# Patient Record
Sex: FEMALE | URBAN - METROPOLITAN AREA
[De-identification: names, ages, dates, MRNs, and addresses within clinical notes are randomized per-mention and may not be internally consistent; named-entity substitution may affect disease eponyms.]

---

## 2017-09-28 ENCOUNTER — HOSPITAL ENCOUNTER (OUTPATIENT)
Dept: LAB | Age: 82
Discharge: HOME | End: 2017-09-28

## 2017-09-28 PROCEDURE — 88360 TUMOR IMMUNOHISTOCHEM/MANUAL: CPT | Performed by: OPHTHALMOLOGY

## 2017-09-28 PROCEDURE — 88307 TISSUE EXAM BY PATHOLOGIST: CPT | Performed by: OPHTHALMOLOGY

## 2017-09-28 PROCEDURE — 88342 IMHCHEM/IMCYTCHM 1ST ANTB: CPT | Performed by: OPHTHALMOLOGY

## 2017-09-28 PROCEDURE — 88365 INSITU HYBRIDIZATION (FISH): CPT | Performed by: OPHTHALMOLOGY

## 2021-06-01 ENCOUNTER — TELEPHONE (OUTPATIENT)
Dept: PALLATIVE CARE | Age: 86
End: 2021-06-01

## 2021-06-01 NOTE — TELEPHONE ENCOUNTER
The patient's daughter, iDlip Meek is calling to confirm if she should go to Hospice or Palliative Care. She is in an assisted living facility at the time and has dementia.  Her call back number is [phone]

## 2021-06-01 NOTE — TELEPHONE ENCOUNTER
[employer] Palliative Medicine Office  Nursing Note  [phone]  Fax [phone]     Name:  Carey Carrasco  YOB: 1933     Returned call to patient's daughter Marcelina Farah who is asking about information regarding Palliative and Hospice. She states her mother has dementia and resides in assisted living at [address] [place] [address] in [place]. This nurse explained Palliative Medicine and Hospice services (similarities and differences) including:     Outpatient Palliative Medicine is an office based specialty service. Provides \"extra layer of support\" for patients and families living with serious illness. Focuses on  symptom management, care decisions, improving quality of life. Palliative team works alongside current health care team and is provided with all other medical treatments. Nathanael Bills sees patients in the latter stages of a progressive serious illness. Patient can be receiving aggressive treatments or they can be seeking comfort measures. Hospice is an all-inclusive service in terms of payment (meds, DME, oxygen, RN visits, aides, respite are covered). Treatment concentrates on comfort rather than aggressive disease abatement. Hospice care is generally for people with a life expectancy of 6 months or less (if the illness runs its normal course). It requires an order by a provider. Marcelina Farah states she thinks hospice is a good option for her mother if she qualifies. She is in the process of switching her mother's PCP to the staff physician at [facility] and she will discuss hospice with the new provider.     ADAIR ChanceN, RN  Palliative Medicine  [phone]

## 2024-04-15 ENCOUNTER — HOSPITAL ENCOUNTER (INPATIENT)
Facility: HOSPITAL | Age: 89
LOS: 4 days | Discharge: NURSING, REHAB OR LONG-TERM CARE | End: 2024-04-19
Attending: EMERGENCY MEDICINE | Admitting: STUDENT IN AN ORGANIZED HEALTH CARE EDUCATION/TRAINING PROGRAM
Payer: MEDICARE

## 2024-04-15 ENCOUNTER — APPOINTMENT (OUTPATIENT)
Facility: HOSPITAL | Age: 89
End: 2024-04-15
Payer: MEDICARE

## 2024-04-15 DIAGNOSIS — A41.9 SEVERE SEPSIS (HCC): ICD-10-CM

## 2024-04-15 DIAGNOSIS — R65.20 SEVERE SEPSIS (HCC): ICD-10-CM

## 2024-04-15 DIAGNOSIS — N39.0 URINARY TRACT INFECTION WITHOUT HEMATURIA, SITE UNSPECIFIED: Primary | ICD-10-CM

## 2024-04-15 DIAGNOSIS — J18.9 MULTIFOCAL PNEUMONIA: ICD-10-CM
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
## 2024-04-15 DIAGNOSIS — F41.9 ANXIETY: ICD-10-CM

## 2024-04-15 DIAGNOSIS — R09.02 HYPOXEMIA: ICD-10-CM

## 2024-04-15 PROBLEM — J15.9 COMMUNITY ACQUIRED BACTERIAL PNEUMONIA: Status: ACTIVE | Noted: 2024-04-15

## 2024-04-15 LAB
ANION GAP BLD CALC-SCNC: 8 (ref 10–20)
APPEARANCE UR: CLEAR
BACTERIA URNS QL MICRO: ABNORMAL /HPF
BASOPHILS # BLD: 0 K/UL (ref 0–0.1)
BASOPHILS NFR BLD: 0 % (ref 0–1)
BILIRUB UR QL: NEGATIVE
CA-I BLD-MCNC: 1.16 MMOL/L (ref 1.12–1.32)
CHLORIDE BLD-SCNC: 106 MMOL/L (ref 100–108)
CK SERPL-CCNC: 156 U/L (ref 26–192)
CO2 BLD-SCNC: 26 MMOL/L (ref 19–24)
COLOR UR: ABNORMAL
CREAT UR-MCNC: <0.3 MG/DL (ref 0.6–1.3)
D DIMER PPP FEU-MCNC: 17.25 MG/L FEU (ref 0–0.65)
DIFFERENTIAL METHOD BLD: ABNORMAL
EKG ATRIAL RATE: 108 BPM
EKG DIAGNOSIS: NORMAL
EKG P AXIS: 76 DEGREES
EKG P-R INTERVAL: 162 MS
EKG Q-T INTERVAL: 356 MS
EKG QRS DURATION: 110 MS
EKG QTC CALCULATION (BAZETT): 477 MS
EKG R AXIS: 6 DEGREES
EKG T AXIS: 91 DEGREES
EKG VENTRICULAR RATE: 108 BPM
EOSINOPHIL # BLD: 0.1 K/UL (ref 0–0.4)
EOSINOPHIL NFR BLD: 1 % (ref 0–7)
EPITH CASTS URNS QL MICRO: ABNORMAL /LPF
ERYTHROCYTE [DISTWIDTH] IN BLOOD BY AUTOMATED COUNT: 14.1 % (ref 11.5–14.5)
GLUCOSE BLD STRIP.AUTO-MCNC: 92 MG/DL (ref 74–106)
GLUCOSE UR STRIP.AUTO-MCNC: NEGATIVE MG/DL
HCO3 BLDA-SCNC: 27 MMOL/L
HCT VFR BLD AUTO: 40.5 % (ref 35–47)
HGB BLD-MCNC: 13.2 G/DL (ref 11.5–16)
HGB UR QL STRIP: ABNORMAL
IMM GRANULOCYTES # BLD AUTO: 0.1 K/UL (ref 0–0.04)
IMM GRANULOCYTES NFR BLD AUTO: 0 % (ref 0–0.5)
KETONES UR QL STRIP.AUTO: NEGATIVE MG/DL
LACTATE BLD-SCNC: 1.04 MMOL/L (ref 0.4–2)
LACTATE BLD-SCNC: 2.35 MMOL/L (ref 0.4–2)
LEUKOCYTE ESTERASE UR QL STRIP.AUTO: ABNORMAL
LYMPHOCYTES # BLD: 1.6 K/UL (ref 0.8–3.5)
LYMPHOCYTES NFR BLD: 13 % (ref 12–49)
MCH RBC QN AUTO: 30.3 PG (ref 26–34)
MCHC RBC AUTO-ENTMCNC: 32.6 G/DL (ref 30–36.5)
MCV RBC AUTO: 92.9 FL (ref 80–99)
MONOCYTES # BLD: 0.9 K/UL (ref 0–1)
MONOCYTES NFR BLD: 7 % (ref 5–13)
NEUTS SEG # BLD: 10.1 K/UL (ref 1.8–8)
NEUTS SEG NFR BLD: 79 % (ref 32–75)
NITRITE UR QL STRIP.AUTO: POSITIVE
NRBC # BLD: 0 K/UL (ref 0–0.01)
NRBC BLD-RTO: 0 PER 100 WBC
PCO2 BLDV: 37.2 MMHG (ref 41–51)
PH BLDV: 7.47 (ref 7.32–7.42)
PH UR STRIP: 6.5 (ref 5–8)
PLATELET # BLD AUTO: 272 K/UL (ref 150–400)
PMV BLD AUTO: 10.2 FL (ref 8.9–12.9)
PO2 BLDV: 38 MMHG (ref 25–40)
POTASSIUM BLD-SCNC: 4.7 MMOL/L (ref 3.5–5.5)
PROT UR STRIP-MCNC: NEGATIVE MG/DL
RBC # BLD AUTO: 4.36 M/UL (ref 3.8–5.2)
RBC #/AREA URNS HPF: ABNORMAL /HPF (ref 0–5)
SAO2 % BLD: 75 %
SERVICE CMNT-IMP: ABNORMAL
SODIUM BLD-SCNC: 140 MMOL/L (ref 136–145)
SP GR UR REFRACTOMETRY: 1.02 (ref 1–1.03)
SPECIMEN SITE: ABNORMAL
TROPONIN I SERPL HS-MCNC: 23 NG/L (ref 0–51)
UROBILINOGEN UR QL STRIP.AUTO: 0.2 EU/DL (ref 0.2–1)
WBC # BLD AUTO: 12.8 K/UL (ref 3.6–11)
WBC URNS QL MICRO: ABNORMAL /HPF (ref 0–4)

## 2024-04-15 PROCEDURE — 6360000002 HC RX W HCPCS: Performed by: STUDENT IN AN ORGANIZED HEALTH CARE EDUCATION/TRAINING PROGRAM

## 2024-04-15 PROCEDURE — 82330 ASSAY OF CALCIUM: CPT

## 2024-04-15 PROCEDURE — 36415 COLL VENOUS BLD VENIPUNCTURE: CPT

## 2024-04-15 PROCEDURE — 2580000003 HC RX 258: Performed by: STUDENT IN AN ORGANIZED HEALTH CARE EDUCATION/TRAINING PROGRAM

## 2024-04-15 PROCEDURE — 6360000004 HC RX CONTRAST MEDICATION: Performed by: STUDENT IN AN ORGANIZED HEALTH CARE EDUCATION/TRAINING PROGRAM

## 2024-04-15 PROCEDURE — 74176 CT ABD & PELVIS W/O CONTRAST: CPT

## 2024-04-15 PROCEDURE — APPNB60 APP NON BILLABLE TIME 46-60 MINS: Performed by: NURSE PRACTITIONER

## 2024-04-15 PROCEDURE — 84295 ASSAY OF SERUM SODIUM: CPT

## 2024-04-15 PROCEDURE — 84484 ASSAY OF TROPONIN QUANT: CPT

## 2024-04-15 PROCEDURE — 71275 CT ANGIOGRAPHY CHEST: CPT

## 2024-04-15 PROCEDURE — 82550 ASSAY OF CK (CPK): CPT

## 2024-04-15 PROCEDURE — 6370000000 HC RX 637 (ALT 250 FOR IP): Performed by: EMERGENCY MEDICINE

## 2024-04-15 PROCEDURE — 93010 ELECTROCARDIOGRAM REPORT: CPT | Performed by: SPECIALIST

## 2024-04-15 PROCEDURE — 71250 CT THORAX DX C-: CPT

## 2024-04-15 PROCEDURE — 82803 BLOOD GASES ANY COMBINATION: CPT

## 2024-04-15 PROCEDURE — 84132 ASSAY OF SERUM POTASSIUM: CPT

## 2024-04-15 PROCEDURE — 2700000000 HC OXYGEN THERAPY PER DAY

## 2024-04-15 PROCEDURE — 87040 BLOOD CULTURE FOR BACTERIA: CPT

## 2024-04-15 PROCEDURE — 72125 CT NECK SPINE W/O DYE: CPT

## 2024-04-15 PROCEDURE — 82947 ASSAY GLUCOSE BLOOD QUANT: CPT

## 2024-04-15 PROCEDURE — 1100000000 HC RM PRIVATE

## 2024-04-15 PROCEDURE — 85379 FIBRIN DEGRADATION QUANT: CPT

## 2024-04-15 PROCEDURE — 2580000003 HC RX 258: Performed by: EMERGENCY MEDICINE

## 2024-04-15 PROCEDURE — 81001 URINALYSIS AUTO W/SCOPE: CPT

## 2024-04-15 PROCEDURE — 94761 N-INVAS EAR/PLS OXIMETRY MLT: CPT

## 2024-04-15 PROCEDURE — 85025 COMPLETE CBC W/AUTO DIFF WBC: CPT

## 2024-04-15 PROCEDURE — 93005 ELECTROCARDIOGRAM TRACING: CPT | Performed by: EMERGENCY MEDICINE

## 2024-04-15 PROCEDURE — 96374 THER/PROPH/DIAG INJ IV PUSH: CPT

## 2024-04-15 PROCEDURE — 99285 EMERGENCY DEPT VISIT HI MDM: CPT

## 2024-04-15 PROCEDURE — 6370000000 HC RX 637 (ALT 250 FOR IP): Performed by: STUDENT IN AN ORGANIZED HEALTH CARE EDUCATION/TRAINING PROGRAM

## 2024-04-15 PROCEDURE — 83605 ASSAY OF LACTIC ACID: CPT

## 2024-04-15 PROCEDURE — 70450 CT HEAD/BRAIN W/O DYE: CPT

## 2024-04-15 RX ORDER — BUSPIRONE HYDROCHLORIDE 5 MG/1
5 TABLET ORAL 2 TIMES DAILY
Status: DISCONTINUED | OUTPATIENT
Start: 2024-04-15 | End: 2024-04-19 | Stop reason: HOSPADM

## 2024-04-15 RX ORDER — SODIUM CHLORIDE 0.9 % (FLUSH) 0.9 %
5-40 SYRINGE (ML) INJECTION EVERY 12 HOURS SCHEDULED
Status: DISCONTINUED | OUTPATIENT
Start: 2024-04-15 | End: 2024-04-19 | Stop reason: HOSPADM

## 2024-04-15 RX ORDER — 0.9 % SODIUM CHLORIDE 0.9 %
1000 INTRAVENOUS SOLUTION INTRAVENOUS ONCE
Status: DISCONTINUED | OUTPATIENT
Start: 2024-04-15 | End: 2024-04-15

## 2024-04-15 RX ORDER — POTASSIUM CHLORIDE 750 MG/1
40 TABLET, FILM COATED, EXTENDED RELEASE ORAL PRN
Status: DISCONTINUED | OUTPATIENT
Start: 2024-04-15 | End: 2024-04-18

## 2024-04-15 RX ORDER — POLYETHYLENE GLYCOL 3350 17 G/17G
17 POWDER, FOR SOLUTION ORAL DAILY PRN
Status: DISCONTINUED | OUTPATIENT
Start: 2024-04-15 | End: 2024-04-19 | Stop reason: HOSPADM

## 2024-04-15 RX ORDER — ACETAMINOPHEN 650 MG/1
650 SUPPOSITORY RECTAL EVERY 6 HOURS PRN
Status: DISCONTINUED | OUTPATIENT
Start: 2024-04-15 | End: 2024-04-19 | Stop reason: HOSPADM

## 2024-04-15 RX ORDER — SODIUM CHLORIDE 9 MG/ML
INJECTION, SOLUTION INTRAVENOUS PRN
Status: DISCONTINUED | OUTPATIENT
Start: 2024-04-15 | End: 2024-04-19 | Stop reason: HOSPADM

## 2024-04-15 RX ORDER — ONDANSETRON 2 MG/ML
4 INJECTION INTRAMUSCULAR; INTRAVENOUS EVERY 6 HOURS PRN
Status: DISCONTINUED | OUTPATIENT
Start: 2024-04-15 | End: 2024-04-19 | Stop reason: HOSPADM

## 2024-04-15 RX ORDER — ACETAMINOPHEN 325 MG/1
650 TABLET ORAL EVERY 6 HOURS PRN
Status: DISCONTINUED | OUTPATIENT
Start: 2024-04-15 | End: 2024-04-19 | Stop reason: HOSPADM

## 2024-04-15 RX ORDER — ONDANSETRON 4 MG/1
4 TABLET, ORALLY DISINTEGRATING ORAL EVERY 8 HOURS PRN
Status: DISCONTINUED | OUTPATIENT
Start: 2024-04-15 | End: 2024-04-19 | Stop reason: HOSPADM

## 2024-04-15 RX ORDER — MEMANTINE HYDROCHLORIDE 10 MG/1
10 TABLET ORAL 2 TIMES DAILY
Status: DISCONTINUED | OUTPATIENT
Start: 2024-04-15 | End: 2024-04-19 | Stop reason: HOSPADM

## 2024-04-15 RX ORDER — 0.9 % SODIUM CHLORIDE 0.9 %
30 INTRAVENOUS SOLUTION INTRAVENOUS ONCE
Status: COMPLETED | OUTPATIENT
Start: 2024-04-15 | End: 2024-04-15

## 2024-04-15 RX ORDER — CLONAZEPAM 1 MG/1
1 TABLET ORAL NIGHTLY
Status: DISCONTINUED | OUTPATIENT
Start: 2024-04-15 | End: 2024-04-19 | Stop reason: HOSPADM

## 2024-04-15 RX ORDER — FAMOTIDINE 20 MG/1
20 TABLET, FILM COATED ORAL 2 TIMES DAILY
Status: DISCONTINUED | OUTPATIENT
Start: 2024-04-15 | End: 2024-04-18

## 2024-04-15 RX ORDER — 0.9 % SODIUM CHLORIDE 0.9 %
30 INTRAVENOUS SOLUTION INTRAVENOUS ONCE
Status: DISCONTINUED | OUTPATIENT
Start: 2024-04-15 | End: 2024-04-15

## 2024-04-15 RX ORDER — SODIUM CHLORIDE 0.9 % (FLUSH) 0.9 %
5-40 SYRINGE (ML) INJECTION PRN
Status: DISCONTINUED | OUTPATIENT
Start: 2024-04-15 | End: 2024-04-19 | Stop reason: HOSPADM

## 2024-04-15 RX ORDER — MAGNESIUM SULFATE IN WATER 40 MG/ML
2000 INJECTION, SOLUTION INTRAVENOUS PRN
Status: DISCONTINUED | OUTPATIENT
Start: 2024-04-15 | End: 2024-04-19 | Stop reason: HOSPADM

## 2024-04-15 RX ORDER — POTASSIUM CHLORIDE 7.45 MG/ML
10 INJECTION INTRAVENOUS PRN
Status: DISCONTINUED | OUTPATIENT
Start: 2024-04-15 | End: 2024-04-18

## 2024-04-15 RX ORDER — ACETAMINOPHEN 500 MG
1000 TABLET ORAL
Status: COMPLETED | OUTPATIENT
Start: 2024-04-15 | End: 2024-04-15

## 2024-04-15 RX ORDER — ENOXAPARIN SODIUM 100 MG/ML
40 INJECTION SUBCUTANEOUS DAILY
Status: DISCONTINUED | OUTPATIENT
Start: 2024-04-15 | End: 2024-04-19 | Stop reason: HOSPADM

## 2024-04-15 RX ADMIN — BUSPIRONE HYDROCHLORIDE 5 MG: 5 TABLET ORAL at 21:38

## 2024-04-15 RX ADMIN — MEMANTINE 10 MG: 10 TABLET ORAL at 21:38

## 2024-04-15 RX ADMIN — ACETAMINOPHEN 1000 MG: 500 TABLET ORAL at 06:21

## 2024-04-15 RX ADMIN — ACETAMINOPHEN 650 MG: 325 TABLET ORAL at 18:07

## 2024-04-15 RX ADMIN — CLONAZEPAM 1 MG: 1 TABLET ORAL at 21:38

## 2024-04-15 RX ADMIN — WATER 1000 MG: 1 INJECTION INTRAMUSCULAR; INTRAVENOUS; SUBCUTANEOUS at 07:37

## 2024-04-15 RX ADMIN — FAMOTIDINE 20 MG: 20 TABLET, FILM COATED ORAL at 14:04

## 2024-04-15 RX ADMIN — AZITHROMYCIN MONOHYDRATE 500 MG: 500 INJECTION, POWDER, LYOPHILIZED, FOR SOLUTION INTRAVENOUS at 09:17

## 2024-04-15 RX ADMIN — IOPAMIDOL 75 ML: 755 INJECTION, SOLUTION INTRAVENOUS at 17:32

## 2024-04-15 RX ADMIN — SODIUM CHLORIDE 1000 ML: 9 INJECTION, SOLUTION INTRAVENOUS at 07:38

## 2024-04-15 RX ADMIN — MEMANTINE 10 MG: 10 TABLET ORAL at 14:04

## 2024-04-15 RX ADMIN — FAMOTIDINE 20 MG: 20 TABLET, FILM COATED ORAL at 21:38

## 2024-04-15 RX ADMIN — SODIUM CHLORIDE 1503 ML: 9 INJECTION, SOLUTION INTRAVENOUS at 07:40

## 2024-04-15 RX ADMIN — SODIUM CHLORIDE, PRESERVATIVE FREE 10 ML: 5 INJECTION INTRAVENOUS at 21:38

## 2024-04-15 RX ADMIN — ENOXAPARIN SODIUM 40 MG: 100 INJECTION SUBCUTANEOUS at 14:05

## 2024-04-15 RX ADMIN — BUSPIRONE HYDROCHLORIDE 5 MG: 5 TABLET ORAL at 14:04

## 2024-04-15 RX ADMIN — SODIUM CHLORIDE 2163 ML: 9 INJECTION, SOLUTION INTRAVENOUS at 09:08

## 2024-04-15 ASSESSMENT — PAIN SCALES - GENERAL
PAINLEVEL_OUTOF10: 7
PAINLEVEL_OUTOF10: 0

## 2024-04-15 ASSESSMENT — PAIN DESCRIPTION - PAIN TYPE: TYPE: ACUTE PAIN

## 2024-04-15 ASSESSMENT — PAIN DESCRIPTION - DESCRIPTORS: DESCRIPTORS: ACHING

## 2024-04-15 ASSESSMENT — PAIN - FUNCTIONAL ASSESSMENT
PAIN_FUNCTIONAL_ASSESSMENT: 0-10
PAIN_FUNCTIONAL_ASSESSMENT: ACTIVITIES ARE NOT PREVENTED

## 2024-04-15 ASSESSMENT — PAIN DESCRIPTION - LOCATION: LOCATION: HEAD

## 2024-04-15 NOTE — ED PROVIDER NOTES
ED SIGN OUT NOTE  Care assumed at [facility] 7:08 AM EDT    Patient was signed out to me by Dr. Dumont.     Patient signed out pending lab results, imaging results, re-evaluation, and disposition      /80   Pulse (!) 109   Temp 96.8 °F (36 °C) (Rectal)   Resp 21   Ht 1.575 m (5' 2\")   Wt 72.1 kg (159 lb)   SpO2 95%   BMI 29.08 kg/m²   Labs Reviewed   CBC WITH AUTO DIFFERENTIAL - Abnormal; Notable for the following components:       Result Value    WBC 12.8 (*)     Neutrophils % 79 (*)     Neutrophils Absolute 10.1 (*)     Immature Granulocytes Absolute 0.1 (*)     All other components within normal limits   URINALYSIS WITH MICROSCOPIC - Abnormal; Notable for the following components:    Blood, Urine TRACE (*)     Nitrite, Urine Positive (*)     Leukocyte Esterase, Urine LARGE (*)     BACTERIA, URINE 3+ (*)     All other components within normal limits   POCT BLOOD GAS & ELECTROLYTES - Abnormal; Notable for the following components:    PH, VENOUS (POC) 7.47 (*)     PCO2, [place], POC 37.2 (*)     POC TCO2 26 (*)     Anion Gap, POC 8 (*)     POC Creatinine <0.3 (*)     POC Lactic Acid 2.35 (*)     All other components within normal limits   CULTURE, BLOOD 1   CULTURE, BLOOD 2   CK   EXTRA TUBES HOLD   POCT LACTIC ACID     CT Head W/O Contrast   Final Result   No acute intracranial process. Diffuse periventricular white matter disease   likely represents chronic small vessel ischemia. Left sphenoid sinus disease.            CT CSpine W/O Contrast   Final Result   No acute fracture or subluxation. Multilevel degenerative changes.      CT CHEST ABDOMEN PELVIS WO CONTRAST Additional Contrast? None   Final Result   Age-indeterminate compression fractures at L1, T7, and T6. Nodular airspace   disease in the right upper lobe and left lower lobe may represent pneumonia.   Large hiatal hernia. Additional incidental findings as detailed above.           ED Course as of 04/15/24 1856   Mon Apr 15,  2024 0623 EKG: Sinus tachycardia rate of 108 beats a minute with normal MN, QT interval.  Left bundle branch block pattern.  Negative Sgarbossa criteria.  No signs of ischemia.  EKG interpreted by Dr. Dumont [ZD]   0704 Lactate 2.35 [ZD]   0720 Sepsis suspected at this time, source UTI v pneumonia. Abx ordered. Cultures already drawn by nursing.  [MG]   0725 Severe sepsis suspected at this time, initial lactic 2.35. No history of CHF per SNF paperwork. 30cc/kg bolus ordered (2,163cc total).  [MG]   0824 Personally updated POA Calli Art and she gave permission for me to call patient's son Sumeet who actually lives closer to here as Calli is in [place]. Spoke with him to update him as well and help coordinate timing of arrival to [facility].  [MG]      ED Course User Index  [MG] Delisa Krause DO  [ZD] Jared Dumont MD     Diagnosis:   1. Urinary tract infection without hematuria, site unspecified    2. Severe sepsis (HCC)    3. Hypoxemia    4. Multifocal pneumonia    5. Fall, initial encounter      Disposition:   Admitted 04/15/2024 07:45:41 AM  Perfect Serve Consult for Admission  7:52 AM    ED Room Number: WER16/16  Patient Name and age:  Shaunna Art 89 y.o.  female  Working Diagnosis:   1. Urinary tract infection without hematuria, site unspecified    2. Severe sepsis (HCC)    3. Hypoxemia    4. Multifocal pneumonia    5. Fall, initial encounter        COVID-19 Suspicion: No  Sepsis present:  Yes  Reassessment  Readmission: No  Isolation Requirements: no  Recommended Level of Care: telemetry  Department: [place] [facility] - [phone]      Total critical care time spent exclusive of procedures:  30 minutes.     Is this patient to be included in the SEP-1 core measure? Yes SEP-1 CORE MEASURE DATA      Sepsis Criteria   Severe Sepsis Criteria   Septic Shock Criteria       Must meet 2:    []Temp >100.9 F (38.3 C) or < 96.8 F (36 C)  []HR > 90  []RR > 20  []WBC > 12 or < 4 or 10% bands    AND:    [] Infection

## 2024-04-15 NOTE — PROGRESS NOTES
Occupational Therapy Contact Note  04/15/24    Chart reviewed. Patient with elevated D-Dimer 17.25 and per RN plan for CTA of chest to r/u PE. Will defer and follow-up pending results.    Thank you,  JUAN FRANCISCO Bryant, OTR/L

## 2024-04-15 NOTE — ED NOTES
0655- Patient returned from CT scan, brief removed for sc, BM present, angi care provided prior to SC, skin lesions noted to perivaginal area, SC completed with 2nd RN GABBIE Doe and JOHN Aguirre RN. Specimens obtained. Bruising noted to left hip.     0705- difficulty starting PIV straight stick for blood specimens obtained  0708-  22g diffusics obtained in left wrist.     0715- Patient repositioned in bed, patient has no voiced complaints or concerns at this time.

## 2024-04-15 NOTE — PROGRESS NOTES
Physical Therapy    Chart reviewed. Patient pending troponin draw, with elevated D-Dimer 17.25 and per RN plan for CTA of chest. Will defer and follow-up pending results.    Leda Pugh, PT, DPT   Detail Level: None

## 2024-04-15 NOTE — H&P
Hospitalist Admission Note    NAME:  Shaunna Art   :  1933   MRN:  [id number]     Date/Time:  4/15/2024 12:57 PM    Patient PCP: None, None  ________________________________________________________________________    Given the patient's current clinical presentation, I have a high level of concern for decompensation if discharged from the emergency department.  Complex decision making was performed, which includes reviewing the patient's available past medical records, laboratory results, and x-ray films.       My assessment of this patient's clinical condition and my plan of care is as follows.    Assessment / Plan:    AHRF and severe sepsis 2/2 CAP: POA. Acute. CT with CAP. Tachycardia and leukocytosis (SIRS 2/4) with lactic acidosis. Lactic acid now normalized. S/p 30cc/kg in ED. Requiring 2L O2  - Admit to medical  - VS per unit  - can stop trending lactic  - Cont CTX and azithro  - Follow up BCx  - Wean O2  - DD to rule out PE  - Get trop to rule out Acs    UTI: POA. On UA. May be contributing to falls and sepsis.  - CTX as above  - Follow BCx and UCx    Multi level compression fractures / frequent falls: POA. No pain currently so I suspect these might be chronic. No rhabdo  - tylenol PRN  - ortho consult  - PT/OT  - fall precautions  - orthostatics    Dementia: POA. Chronic  - cont home meds    GERD: POA. Chronic  - cont home meds    I have personally reviewed the radiographs, laboratory data in Epic and decisions and statements above are based partially on this personal interpretation.    Code Status: DNR/DNI  DVT Prophylaxis: Lovenox  GI Prophylaxis: not indicated       Subjective:   CHIEF COMPLAINT: \"multiple falls\"    HISTORY OF PRESENT ILLNESS:     Shaunna is a 89 y.o.   female with PMHx dementia and GERD presents from SNF s/p recent GLF. She has no complaints for me today. In the ED she was found to have CAP.    No fever, chills, night sweats. No hearing or vision changes. No severe

## 2024-04-15 NOTE — CONSULTS
Orthopedic History and Physical     Patient: Shaunna Art MRN: [id number]  SSN: [id number]    YOB: 1933  Age: 89 y.o.  Sex: female          Subjective:     Patient is a 89 y.o.  female admitted to [facility] due to multiple falls and for sepsis 2/2 CAP, AHRH, UTI,  CT abd/ pelvis showed age-indeterminate compression fractures at L1, T7, and T6. Pt with hx of dementia, GERD who lives in a SNF.  History is obtained from the chart. Pt with dementia and unable to provide hx. Pt does however deny back pain. No family at bedside. Pt has been started on IV abx, blood cultures obtained.   Patient Active Problem List    Diagnosis Date Noted    Community acquired bacterial pneumonia 04/15/2024     No past medical history on file.   No past surgical history on file.   Prior to Admission medications    Not on File     Current Facility-Administered Medications   Medication Dose Route Frequency    azithromycin (ZITHROMAX) 500 mg in sodium chloride 0.9 % 250 mL IVPB ([id number])  500 mg IntraVENous Q24H    sodium chloride flush 0.9 % injection 5-40 mL  5-40 mL IntraVENous 2 times per day    sodium chloride flush 0.9 % injection 5-40 mL  5-40 mL IntraVENous PRN    0.9 % sodium chloride infusion   IntraVENous PRN    potassium chloride (KLOR-CON) extended release tablet 40 mEq  40 mEq Oral PRN    Or    potassium bicarb-citric acid (EFFER-K) effervescent tablet 40 mEq  40 mEq Oral PRN    Or    potassium chloride 10 mEq/100 mL IVPB (Peripheral Line)  10 mEq IntraVENous PRN    magnesium sulfate 2000 mg in 50 mL IVPB premix  2,000 mg IntraVENous PRN    enoxaparin (LOVENOX) injection 40 mg  40 mg SubCUTAneous Daily    ondansetron (ZOFRAN-ODT) disintegrating tablet 4 mg  4 mg Oral Q8H PRN    Or    ondansetron (ZOFRAN) injection 4 mg  4 mg IntraVENous Q6H PRN    polyethylene glycol (GLYCOLAX) packet 17 g  17 g Oral Daily PRN    acetaminophen (TYLENOL) tablet 650 mg  650 mg Oral Q6H PRN    Or     Esterase, Urine LARGE (A) NEG      WBC, UA  0 - 4 /hpf    RBC, UA 5-10 0 - 5 /hpf    Epithelial Cells UA FEW FEW /lpf    BACTERIA, URINE 3+ (A) NEG /hpf   POCT Blood Gas & Electrolytes    Collection Time: 04/15/24  7:00 AM   Result Value Ref Range    PH, VENOUS (POC) 7.47 (H) 7.32 - 7.42      PCO2, [place]i, POC 37.2 (L) 41 - 51 MMHG    PO2, VENOUS (POC) 38 25 - 40 mmHg    HCO3, Arterial 27 mmol/L    POC O2 SAT 75 %    POC Sodium 140 136 - 145 MMOL/L    POC Potassium 4.7 3.5 - 5.5 MMOL/L    POC Chloride 106 100 - 108 MMOL/L    POC TCO2 26 (H) 19 - 24 MMOL/L    Anion Gap, POC 8 (L) 10 - 20      POC Glucose 92 74 - 106 MG/DL    POC Creatinine <0.3 (L) 0.6 - 1.3 MG/DL    eGFR, POC Cannot be calculated >60 ml/min/1.73m2    POC Ionized Calcium 1.16 1.12 - 1.32 mmol/L    POC Lactic Acid 2.35 (HH) 0.40 - 2.00 mmol/L    Source VENOUS BLOOD      Critical Value Read Back MARY    POC Lactic Acid    Collection Time: 04/15/24  9:57 AM   Result Value Ref Range    POC Lactic Acid 1.04 0.40 - 2.00 mmol/L       Assessment:     Patient Active Problem List    Diagnosis Date Noted    Community acquired bacterial pneumonia 04/15/2024         Plan:   89 yo female with incidental finding of age-indeterminate compression fractures at L1, T7, and T6. These are likely chronic in nature. Pt is without pain and she is asymptomatic.   Recommend consevative management and ongoing PT/ pain control as needed.   Follow up outpatient with Dr. Santana with [facility] for further issues.   Ortho to sign off. Please perfect serve for any issues.   Discussed with Dr. Simms, he agrees with above plan.       JESSICA Armstrong - NP  Orthopaedic Surgery Nurse Practitioner

## 2024-04-15 NOTE — PROGRESS NOTES
Is this patient to be included in the SEP-1 core measure? Yes SEP-1 CORE MEASURE DATA      Sepsis Criteria   Severe Sepsis Criteria   Septic Shock Criteria       Must meet 2:    []Temp >100.9 F (38.3 C) or < 96.8 F (36 C)  [x]HR > 90  [x]RR > 20  [x]WBC > 12 or < 4 or 10% bands    AND:    [x] Infection Confirmed or Suspected.     Must meet 1:    [x]Lactate > 2       or   []Signs of Organ Dysfunction:    - SBP < 90 or MAP < 65  -Creatinine > 2 or increased from baseline  -Urine Output < 0.5 ml/kg/hr  -Bilirubin > 2  -INR > 1.5 (not anticoagulated)  -Platelets < 100,000  -Acute Respiratory Failure as evidenced by new need for NIPPV or mechanical ventilation   Must meet 1:    []Lactate > 4        or   []SBP < 90 or MAP < 65 for at least two readings in the first hour after fluid bolus administration    []Vasopressors initiated (if hypotension persists after fluid resuscitation)   Patient Vitals for the past 6 hrs:   BP Temp Pulse Resp SpO2   04/15/24 0730 138/80 -- (!) 109 21 95 %   04/15/24 1130 137/62 99.3 °F (37.4 °C) (!) 115 22 94 %      Recent Labs     04/15/24  0620 04/15/24  0700   WBC 12.8*  --    CREATININE  --  <0.3*     --         Severe sepsis identified date: 4/15/24 time: 1:20pm    Fluid Resuscitation Rationale: at least 30mL/kg based on entered actual weight at time of triage    Repeat lactate level: improving    Reassessment Exam: Not applicable. Patient does not have septic shock.

## 2024-04-15 NOTE — ED NOTES
EMS sts unwitnessed fall some time last night, pt sts she was o the ground for a while. Sts head pain, no visible trauma. 7/10 pain aching.

## 2024-04-15 NOTE — ED NOTES
TRANSFER - OUT REPORT:    Verbal report given to RUBÉN Wu on Shaunna Art  being transferred to [facility] ED for routine progression of patient care       Report consisted of patient's Situation, Background, Assessment and   Recommendations(SBAR).     Information from the following report(s) Nurse Handoff Report, ED Encounter Summary, ED SBAR, Adult Overview, Intake/Output, MAR, and Recent Results was reviewed with the receiving nurse.    [place] Fall Assessment:    Presents to emergency department  because of falls (Syncope, seizure, or loss of consciousness): Yes  Age > 70: Yes  Altered Mental Status, Intoxication with alcohol or substance confusion (Disorientation, impaired judgment, poor safety awaremess, or inability to follow instructions): Yes  Impaired Mobility: Ambulates or transfers with assistive devices or assistance; Unable to ambulate or transer.: No  Nursing Judgement: Yes          Lines:   Peripheral IV 04/15/24 Right Antecubital (Active)        Opportunity for questions and clarification was provided.      Patient transported with:  Monitor, O2 @ 2lpm, and Patient-specific medications from Pharmacy (zithromax via IV pump)

## 2024-04-15 NOTE — ED PROVIDER NOTES
[facility] EMERGENCY DEPT  EMERGENCY DEPARTMENT ENCOUNTER      Pt Name: Shaunna Art  MRN: [id number]  Birthdate 1/18/1933  Date of evaluation: 4/15/2024  Provider: Jared Dumont MD    CHIEF COMPLAINT       Chief Complaint   Patient presents with    Fall       EMERGENCY DEPARTMENT COURSE and DIFFERENTIAL DIAGNOSIS/MDM:   Medical Decision Making    89-year-old female brought in the ER by EMS after having unwitnessed fall at a memory care unit.  Patient complaining of pain but is unable to specify location.  Patient has history of advanced dementia as well as sternal fracture, anxiety, however hernia, constipation, lymphoma, anemia, seizures, GERD, chronic pain  No report of any blood thinners.  On arrival patient having low oxygen saturation 86% on room air placed on supplemental oxygen.  Patient is a poor historian and complains of pain but cannot localize pain making examination difficult.  No obvious deformities in the extremities and able to range of motion however lower extremities seem to be very stiff.  Normal pulses.  Patient is lungs with some crackles/rhonchi at the right middle lung and base of the left lung.  Concern for possible pneumonia in light of low oxygen saturation.  Patient is afebrile.  Ordered labs and electrolytes hold labs with cultures.  Lactate.  Ordered CAT scan chest C-spine abdomen pelvis to rule out traumatic injury as well as infectious etiology.  Ordered urinalysis.  Pending labs and imaging results.  Patient signed out to     Amount and/or Complexity of Data Reviewed  Labs: ordered.  Radiology: ordered.  ECG/medicine tests: ordered and independent interpretation performed. Decision-making details documented in ED Course.    Risk  OTC drugs.  Prescription drug management.  Decision regarding hospitalization.            REASSESSMENT     ED Course as of 04/17/24 0927   Mon Apr 15, 2024   0623 EKG: Sinus tachycardia rate of 108 beats a minute with normal VT, QT interval.   Left bundle branch block pattern.  Negative Sgarbossa criteria.  No signs of ischemia.  EKG interpreted by Dr. Dumont [ZD]   0704 Lactate 2.35 [ZD]   0720 Sepsis suspected at this time, source UTI v pneumonia. Abx ordered. Cultures already drawn by nursing.  [MG]   0725 Severe sepsis suspected at this time, initial lactic 2.35. No history of CHF per SNF paperwork. 30cc/kg bolus ordered (2,163cc total).  [MG]   0824 Personally updated POA Calli Art and she gave permission for me to call patient's son Sumeet who actually lives closer to here as Calli is in [place]. Spoke with him to update him as well and help coordinate timing of arrival to [facility].  [MG]      ED Course User Index  [MG] Delisa Krause DO  [ZD] Jared Dumont MD         HISTORY OF PRESENT ILLNESS    EMS sts unwitnessed fall some time last night, pt sts she was o the ground for a while. Sts head pain, no visible trauma. 7/10 pain aching.     89-year-old female brought in the ER by EMS after having unwitnessed fall at a memory care unit.          Nursing Notes were reviewed.    REVIEW OF SYSTEMS       Review of Systems      PAST MEDICAL HISTORY   No past medical history on file.      SURGICAL HISTORY     No past surgical history on file.      CURRENT MEDICATIONS       Previous Medications    No medications on file       ALLERGIES     Pravastatin, Promethazine, and Sulfa antibiotics    FAMILY HISTORY     No family history on file.       SOCIAL HISTORY          SCREENINGS         [place] Coma Scale  Eye Opening: Spontaneous  Best Verbal Response: Confused  Best Motor Response: Obeys commands  Kevin Coma Scale Score: 14                     CIWA Assessment  BP: (!) 162/75  Pulse: (!) 106                 PHYSICAL EXAM       Vitals:    04/15/24 0559 04/15/24 0600 04/15/24 0604 04/15/24 0615   BP: (!) 160/92 (!) 162/75     Pulse: (!) 112   (!) 106   Resp: 22   23   Temp: 98.3 °F (36.8 °C)      SpO2: 90% (!) 89% 98% (!) 89%       There is no height or

## 2024-04-15 NOTE — ED NOTES
POA called via phone and updated on plan of care and patient assessment by MD Krause.     [place] Lilian called and updated on patient admission to hospital

## 2024-04-15 NOTE — ED NOTES
TRANSFER - OUT REPORT:    Verbal report given to RUBÉN Briceño on Shaunna Art  being transferred to [facility] for routine progression of patient care       Report consisted of patient's Situation, Background, Assessment and   Recommendations(SBAR).     Information from the following report(s) Nurse Handoff Report, ED Encounter Summary, ED SBAR, Intake/Output, MAR, Recent Results, and Med Rec Status was reviewed with the receiving nurse.    [place] Fall Assessment:    Presents to emergency department  because of falls (Syncope, seizure, or loss of consciousness): Yes  Age > 70: Yes  Altered Mental Status, Intoxication with alcohol or substance confusion (Disorientation, impaired judgment, poor safety awaremess, or inability to follow instructions): Yes  Impaired Mobility: Ambulates or transfers with assistive devices or assistance; Unable to ambulate or transer.: No  Nursing Judgement: Yes          Lines:   Peripheral IV 04/15/24 Right Antecubital (Active)        Opportunity for questions and clarification was provided.      Patient transported with:  Monitor and O2 @ 2lpm

## 2024-04-16 PROBLEM — J96.01 ACUTE RESPIRATORY FAILURE WITH HYPOXIA (HCC): Status: ACTIVE | Noted: 2024-04-16

## 2024-04-16 PROBLEM — N39.0 URINARY TRACT INFECTION: Status: ACTIVE | Noted: 2024-04-16

## 2024-04-16 PROBLEM — R29.6 FALLS FREQUENTLY: Status: ACTIVE | Noted: 2024-04-16

## 2024-04-16 PROBLEM — K21.9 HIATAL HERNIA WITH GERD: Status: ACTIVE | Noted: 2024-04-16

## 2024-04-16 PROBLEM — F03.90 DEMENTIA (HCC): Status: ACTIVE | Noted: 2024-04-16

## 2024-04-16 PROBLEM — J18.9 MULTIFOCAL PNEUMONIA: Status: ACTIVE | Noted: 2024-04-15

## 2024-04-16 PROBLEM — S22.080D: Status: ACTIVE | Noted: 2024-04-16

## 2024-04-16 PROBLEM — S32.010D: Status: ACTIVE | Noted: 2024-04-16

## 2024-04-16 PROBLEM — K44.9 HIATAL HERNIA: Status: ACTIVE | Noted: 2024-04-16

## 2024-04-16 LAB
ALBUMIN SERPL-MCNC: 3 G/DL (ref 3.5–5)
ALBUMIN/GLOB SERPL: 0.8 (ref 1.1–2.2)
ALP SERPL-CCNC: 87 U/L (ref 45–117)
ALT SERPL-CCNC: 16 U/L (ref 12–78)
ANION GAP SERPL CALC-SCNC: 5 MMOL/L (ref 5–15)
AST SERPL-CCNC: 22 U/L (ref 15–37)
BASOPHILS # BLD: 0 K/UL (ref 0–0.1)
BASOPHILS NFR BLD: 0 % (ref 0–1)
BILIRUB SERPL-MCNC: 0.6 MG/DL (ref 0.2–1)
BUN SERPL-MCNC: 8 MG/DL (ref 6–20)
BUN/CREAT SERPL: 12 (ref 12–20)
CALCIUM SERPL-MCNC: 8.9 MG/DL (ref 8.5–10.1)
CHLORIDE SERPL-SCNC: 105 MMOL/L (ref 97–108)
CO2 SERPL-SCNC: 26 MMOL/L (ref 21–32)
CREAT SERPL-MCNC: 0.66 MG/DL (ref 0.55–1.02)
DIFFERENTIAL METHOD BLD: ABNORMAL
EOSINOPHIL # BLD: 0.3 K/UL (ref 0–0.4)
EOSINOPHIL NFR BLD: 3 % (ref 0–7)
ERYTHROCYTE [DISTWIDTH] IN BLOOD BY AUTOMATED COUNT: 13.9 % (ref 11.5–14.5)
GLOBULIN SER CALC-MCNC: 3.7 G/DL (ref 2–4)
GLUCOSE SERPL-MCNC: 89 MG/DL (ref 65–100)
HCT VFR BLD AUTO: 33.6 % (ref 35–47)
HGB BLD-MCNC: 11.3 G/DL (ref 11.5–16)
IMM GRANULOCYTES # BLD AUTO: 0 K/UL (ref 0–0.04)
IMM GRANULOCYTES NFR BLD AUTO: 0 % (ref 0–0.5)
LYMPHOCYTES # BLD: 1.3 K/UL (ref 0.8–3.5)
LYMPHOCYTES NFR BLD: 17 % (ref 12–49)
MCH RBC QN AUTO: 30.1 PG (ref 26–34)
MCHC RBC AUTO-ENTMCNC: 33.6 G/DL (ref 30–36.5)
MCV RBC AUTO: 89.4 FL (ref 80–99)
MONOCYTES # BLD: 0.9 K/UL (ref 0–1)
MONOCYTES NFR BLD: 11 % (ref 5–13)
NEUTS SEG # BLD: 5.6 K/UL (ref 1.8–8)
NEUTS SEG NFR BLD: 69 % (ref 32–75)
NRBC # BLD: 0 K/UL (ref 0–0.01)
NRBC BLD-RTO: 0 PER 100 WBC
PLATELET # BLD AUTO: 232 K/UL (ref 150–400)
PMV BLD AUTO: 11 FL (ref 8.9–12.9)
POTASSIUM SERPL-SCNC: 3.4 MMOL/L (ref 3.5–5.1)
PROT SERPL-MCNC: 6.7 G/DL (ref 6.4–8.2)
RBC # BLD AUTO: 3.76 M/UL (ref 3.8–5.2)
SODIUM SERPL-SCNC: 136 MMOL/L (ref 136–145)
WBC # BLD AUTO: 8.1 K/UL (ref 3.6–11)

## 2024-04-16 PROCEDURE — 2580000003 HC RX 258: Performed by: STUDENT IN AN ORGANIZED HEALTH CARE EDUCATION/TRAINING PROGRAM

## 2024-04-16 PROCEDURE — 85025 COMPLETE CBC W/AUTO DIFF WBC: CPT

## 2024-04-16 PROCEDURE — 1100000000 HC RM PRIVATE

## 2024-04-16 PROCEDURE — 80053 COMPREHEN METABOLIC PANEL: CPT

## 2024-04-16 PROCEDURE — 6370000000 HC RX 637 (ALT 250 FOR IP): Performed by: STUDENT IN AN ORGANIZED HEALTH CARE EDUCATION/TRAINING PROGRAM

## 2024-04-16 PROCEDURE — 6360000002 HC RX W HCPCS: Performed by: STUDENT IN AN ORGANIZED HEALTH CARE EDUCATION/TRAINING PROGRAM

## 2024-04-16 PROCEDURE — 97116 GAIT TRAINING THERAPY: CPT

## 2024-04-16 PROCEDURE — 0202U NFCT DS 22 TRGT SARS-COV-2: CPT

## 2024-04-16 PROCEDURE — 97162 PT EVAL MOD COMPLEX 30 MIN: CPT

## 2024-04-16 PROCEDURE — 36415 COLL VENOUS BLD VENIPUNCTURE: CPT

## 2024-04-16 PROCEDURE — 2700000000 HC OXYGEN THERAPY PER DAY

## 2024-04-16 RX ORDER — SODIUM CHLORIDE, SODIUM LACTATE, POTASSIUM CHLORIDE, CALCIUM CHLORIDE 600; 310; 30; 20 MG/100ML; MG/100ML; MG/100ML; MG/100ML
INJECTION, SOLUTION INTRAVENOUS CONTINUOUS
Status: DISPENSED | OUTPATIENT
Start: 2024-04-16 | End: 2024-04-16

## 2024-04-16 RX ORDER — HALOPERIDOL 5 MG/ML
5 INJECTION INTRAMUSCULAR ONCE
Status: COMPLETED | OUTPATIENT
Start: 2024-04-16 | End: 2024-04-16

## 2024-04-16 RX ADMIN — ACETAMINOPHEN 650 MG: 325 TABLET ORAL at 10:42

## 2024-04-16 RX ADMIN — FAMOTIDINE 20 MG: 20 TABLET, FILM COATED ORAL at 21:32

## 2024-04-16 RX ADMIN — SODIUM CHLORIDE, POTASSIUM CHLORIDE, SODIUM LACTATE AND CALCIUM CHLORIDE: 600; 310; 30; 20 INJECTION, SOLUTION INTRAVENOUS at 10:53

## 2024-04-16 RX ADMIN — WATER 1000 MG: 1 INJECTION INTRAMUSCULAR; INTRAVENOUS; SUBCUTANEOUS at 08:35

## 2024-04-16 RX ADMIN — ENOXAPARIN SODIUM 40 MG: 100 INJECTION SUBCUTANEOUS at 08:37

## 2024-04-16 RX ADMIN — BUSPIRONE HYDROCHLORIDE 5 MG: 5 TABLET ORAL at 08:33

## 2024-04-16 RX ADMIN — MEMANTINE 10 MG: 10 TABLET ORAL at 21:33

## 2024-04-16 RX ADMIN — FAMOTIDINE 20 MG: 20 TABLET, FILM COATED ORAL at 08:33

## 2024-04-16 RX ADMIN — CLONAZEPAM 1 MG: 1 TABLET ORAL at 21:33

## 2024-04-16 RX ADMIN — SODIUM CHLORIDE, PRESERVATIVE FREE 10 ML: 5 INJECTION INTRAVENOUS at 09:23

## 2024-04-16 RX ADMIN — HALOPERIDOL LACTATE 5 MG: 5 INJECTION, SOLUTION INTRAMUSCULAR at 15:09

## 2024-04-16 RX ADMIN — MEMANTINE 10 MG: 10 TABLET ORAL at 08:33

## 2024-04-16 RX ADMIN — SODIUM CHLORIDE, PRESERVATIVE FREE 10 ML: 5 INJECTION INTRAVENOUS at 21:33

## 2024-04-16 RX ADMIN — BUSPIRONE HYDROCHLORIDE 5 MG: 5 TABLET ORAL at 21:32

## 2024-04-16 RX ADMIN — AZITHROMYCIN MONOHYDRATE 500 MG: 500 INJECTION, POWDER, LYOPHILIZED, FOR SOLUTION INTRAVENOUS at 09:21

## 2024-04-16 ASSESSMENT — PAIN SCALES - GENERAL: PAINLEVEL_OUTOF10: 10

## 2024-04-16 NOTE — PROGRESS NOTES
[facility]  [address] [place] [address] [phone]        Hospitalist Progress Note      NAME: Shaunna Art   :  1933  MRM:  [id number]    Date/Time of service: 2024  9:32 AM       Subjective:     Chief Complaint:  Patient was personally seen and examined by me during this time period.  Chart reviewed.  F/up PNA and UTI as well as multiple GLF and chronic compression fractures.    Feeling better this am. No CP, SOB       Objective:       Vitals:       Last 24hrs VS reviewed since prior progress note. Most recent are:    Vitals:    24 0800   BP: (!) 127/95   Pulse: (!) 116   Resp:    Temp: 98.1 °F (36.7 °C)   SpO2: 90%     SpO2 Readings from Last 6 Encounters:   24 90%          Intake/Output Summary (Last 24 hours) at 2024 0932  Last data filed at 2024 0630  Gross per 24 hour   Intake 2623.46 ml   Output 450 ml   Net 2173.46 ml        Exam:     Physical Exam:     Gen:    in no acute distress  HEENT:  Pink conjunctivae, EOMI, hearing intact to voice, moist mucous membranes  [facility]  [facility]  Card:   No murmurs, normal S1, S2 without thrills, bruits or peripheral edema  Abd:  Soft, non-tender, non-distended  Skin:   No rashes or ulcers, skin turgor is good  Neuro:  Cranial nerves are grossly intact, no focal motor weakness, follows commands appropriately  Psych:  poor insight, oriented to person, alert      Medications Reviewed: (see below)    Lab Data Reviewed: (see below)    ______________________________________________________________________    Medications:     Current Facility-Administered Medications   Medication Dose Route Frequency    azithromycin (ZITHROMAX) 500 mg in sodium chloride 0.9 % 250 mL IVPB ([id number])  500 mg IntraVENous Q24H    sodium chloride flush 0.9 % injection 5-40 mL  5-40 mL IntraVENous 2 times per day    sodium chloride flush 0.9 % injection 5-40 mL  5-40 mL IntraVENous PRN    0.9 % sodium chloride

## 2024-04-16 NOTE — PLAN OF CARE
Problem: Physical Therapy - Adult  Goal: By Discharge: Performs mobility at highest level of function for planned discharge setting.  See evaluation for individualized goals.  Description: FUNCTIONAL STATUS PRIOR TO ADMISSION: Patient with unknown prior level of function; lives at [facility] per RN report.     HOME SUPPORT PRIOR TO ADMISSION: Patient lived at [facility] facility     Physical Therapy Goals  Initiated 4/16/2024  1.  Patient will move from supine to sit and sit to supine, scoot up and down, and roll side to side in bed with modified independence within 7 day(s).    2.  Patient will perform sit to stand with contact guard assist within 7 day(s).  3.  Patient will transfer from bed to chair and chair to bed with contact guard assist using the least restrictive device within 7 day(s).  4.  Patient will ambulate with contact guard assist for 150 feet with the least restrictive device within 7 day(s).     Outcome: Progressing   PHYSICAL THERAPY EVALUATION    Patient: Shaunna Art (89 y.o. female)  Date: 4/16/2024  Primary Diagnosis: Hypoxemia [R09.02]  Community acquired bacterial pneumonia [J15.9]  Fall, initial encounter [W19.XXXA]  Severe sepsis (HCC) [A41.9, R65.20]  Urinary tract infection without hematuria, site unspecified [N39.0]  Multifocal pneumonia [J18.9]       Precautions:                  fall risk       ASSESSMENT :   DEFICITS/IMPAIRMENTS:   The patient is limited by decreased functional mobility, independence in ADLs, activity tolerance, cognition, command following, attention/concentration, balance in the setting of hospital admission for GLF, UTI. PMH notable for dementia and CT finds age indeterminate T6, T7 and L1 compression fractures and ununited chronic sternal fracture. Patient without complaints of pain during session. She is received agitated, attempting to get out of bed with PCTs requesting additional help. Patient transfers to seated edge of bed with Edi and attempts  PMCID: [id number].  4. Eleanor PURCELL, Loraine S, Ilene W, Daphne P. AM-PAC Short Forms Manual 4.0. Revised 2/2020.                                                                                                                                                                                                                              Pain Rating:  Patient without reports of pain during therapy  Pain Intervention(s):       Activity Tolerance:   Fair  and requires rest breaks    After treatment:   Patient left in no apparent distress in bed, Call bell within reach, Bed/ chair alarm activated, Side rails x3, and PCT sitter present in room    COMMUNICATION/EDUCATION:   The patient's plan of care was discussed with: occupational therapist and registered nurse & CM         Thank you for this referral.  Joaquín Mullins PT, DPT  Minutes: 26

## 2024-04-16 NOTE — PROGRESS NOTES
Chart reviewed in preparation for physical therapy evaluation. Attempted to work with patient 2x this AM. At first attempt granddaughter at bedside and patient requesting pain medication prior to PT participation. At second attempt, patient politely refusing therapy and requesting to rest. Will continue to follow and attempt as able.    Thank you,  Joaquín Mullins, PT, DPT

## 2024-04-16 NOTE — PROGRESS NOTES
Occupational Therapy  Attempted to see for evaluation 2 x's this date, on first attempt requested to wait till pain med received. On 2nd attempt patient agitated and swinging at staff, now calmer and recommend to hold. Will follow up tomorrow.  Janiya Aviles, OTR/L

## 2024-04-17 LAB
ALBUMIN SERPL-MCNC: 2.9 G/DL (ref 3.5–5)
ALBUMIN/GLOB SERPL: 0.8 (ref 1.1–2.2)
ALP SERPL-CCNC: 82 U/L (ref 45–117)
ALT SERPL-CCNC: 15 U/L (ref 12–78)
ANION GAP SERPL CALC-SCNC: 6 MMOL/L (ref 5–15)
AST SERPL-CCNC: 22 U/L (ref 15–37)
B PERT DNA SPEC QL NAA+PROBE: NOT DETECTED
BASOPHILS # BLD: 0 K/UL (ref 0–0.1)
BASOPHILS NFR BLD: 0 % (ref 0–1)
BILIRUB SERPL-MCNC: 0.7 MG/DL (ref 0.2–1)
BORDETELLA PARAPERTUSSIS BY PCR: NOT DETECTED
BUN SERPL-MCNC: 10 MG/DL (ref 6–20)
BUN/CREAT SERPL: 13 (ref 12–20)
C PNEUM DNA SPEC QL NAA+PROBE: NOT DETECTED
CALCIUM SERPL-MCNC: 8.5 MG/DL (ref 8.5–10.1)
CHLORIDE SERPL-SCNC: 105 MMOL/L (ref 97–108)
CO2 SERPL-SCNC: 24 MMOL/L (ref 21–32)
CREAT SERPL-MCNC: 0.79 MG/DL (ref 0.55–1.02)
DIFFERENTIAL METHOD BLD: ABNORMAL
EOSINOPHIL # BLD: 0.2 K/UL (ref 0–0.4)
EOSINOPHIL NFR BLD: 2 % (ref 0–7)
ERYTHROCYTE [DISTWIDTH] IN BLOOD BY AUTOMATED COUNT: 13.9 % (ref 11.5–14.5)
FLUAV SUBTYP SPEC NAA+PROBE: NOT DETECTED
FLUBV RNA SPEC QL NAA+PROBE: NOT DETECTED
GLOBULIN SER CALC-MCNC: 3.7 G/DL (ref 2–4)
GLUCOSE SERPL-MCNC: 98 MG/DL (ref 65–100)
HADV DNA SPEC QL NAA+PROBE: NOT DETECTED
HCOV 229E RNA SPEC QL NAA+PROBE: NOT DETECTED
HCOV HKU1 RNA SPEC QL NAA+PROBE: NOT DETECTED
HCOV NL63 RNA SPEC QL NAA+PROBE: NOT DETECTED
HCOV OC43 RNA SPEC QL NAA+PROBE: NOT DETECTED
HCT VFR BLD AUTO: 32.9 % (ref 35–47)
HGB BLD-MCNC: 11.2 G/DL (ref 11.5–16)
HMPV RNA SPEC QL NAA+PROBE: NOT DETECTED
HPIV1 RNA SPEC QL NAA+PROBE: NOT DETECTED
HPIV2 RNA SPEC QL NAA+PROBE: NOT DETECTED
HPIV3 RNA SPEC QL NAA+PROBE: NOT DETECTED
HPIV4 RNA SPEC QL NAA+PROBE: NOT DETECTED
IMM GRANULOCYTES # BLD AUTO: 0 K/UL (ref 0–0.04)
IMM GRANULOCYTES NFR BLD AUTO: 0 % (ref 0–0.5)
LYMPHOCYTES # BLD: 1.8 K/UL (ref 0.8–3.5)
LYMPHOCYTES NFR BLD: 21 % (ref 12–49)
M PNEUMO DNA SPEC QL NAA+PROBE: NOT DETECTED
MCH RBC QN AUTO: 30.1 PG (ref 26–34)
MCHC RBC AUTO-ENTMCNC: 34 G/DL (ref 30–36.5)
MCV RBC AUTO: 88.4 FL (ref 80–99)
MONOCYTES # BLD: 0.9 K/UL (ref 0–1)
MONOCYTES NFR BLD: 11 % (ref 5–13)
NEUTS SEG # BLD: 5.3 K/UL (ref 1.8–8)
NEUTS SEG NFR BLD: 66 % (ref 32–75)
NRBC # BLD: 0 K/UL (ref 0–0.01)
NRBC BLD-RTO: 0 PER 100 WBC
PLATELET # BLD AUTO: 238 K/UL (ref 150–400)
PMV BLD AUTO: 10.1 FL (ref 8.9–12.9)
POTASSIUM SERPL-SCNC: 3.5 MMOL/L (ref 3.5–5.1)
PROT SERPL-MCNC: 6.6 G/DL (ref 6.4–8.2)
RBC # BLD AUTO: 3.72 M/UL (ref 3.8–5.2)
RSV RNA SPEC QL NAA+PROBE: NOT DETECTED
RV+EV RNA SPEC QL NAA+PROBE: NOT DETECTED
SARS-COV-2 RNA RESP QL NAA+PROBE: NOT DETECTED
SODIUM SERPL-SCNC: 135 MMOL/L (ref 136–145)
WBC # BLD AUTO: 8.2 K/UL (ref 3.6–11)

## 2024-04-17 PROCEDURE — 85025 COMPLETE CBC W/AUTO DIFF WBC: CPT

## 2024-04-17 PROCEDURE — 97535 SELF CARE MNGMENT TRAINING: CPT

## 2024-04-17 PROCEDURE — 2580000003 HC RX 258: Performed by: STUDENT IN AN ORGANIZED HEALTH CARE EDUCATION/TRAINING PROGRAM

## 2024-04-17 PROCEDURE — 36415 COLL VENOUS BLD VENIPUNCTURE: CPT

## 2024-04-17 PROCEDURE — 97530 THERAPEUTIC ACTIVITIES: CPT

## 2024-04-17 PROCEDURE — 6370000000 HC RX 637 (ALT 250 FOR IP): Performed by: STUDENT IN AN ORGANIZED HEALTH CARE EDUCATION/TRAINING PROGRAM

## 2024-04-17 PROCEDURE — 1100000000 HC RM PRIVATE

## 2024-04-17 PROCEDURE — 94761 N-INVAS EAR/PLS OXIMETRY MLT: CPT

## 2024-04-17 PROCEDURE — 80053 COMPREHEN METABOLIC PANEL: CPT

## 2024-04-17 PROCEDURE — 97165 OT EVAL LOW COMPLEX 30 MIN: CPT

## 2024-04-17 PROCEDURE — 97116 GAIT TRAINING THERAPY: CPT

## 2024-04-17 PROCEDURE — 6360000002 HC RX W HCPCS: Performed by: INTERNAL MEDICINE

## 2024-04-17 PROCEDURE — 6360000002 HC RX W HCPCS: Performed by: STUDENT IN AN ORGANIZED HEALTH CARE EDUCATION/TRAINING PROGRAM

## 2024-04-17 RX ORDER — FUROSEMIDE 10 MG/ML
20 INJECTION INTRAMUSCULAR; INTRAVENOUS ONCE
Status: COMPLETED | OUTPATIENT
Start: 2024-04-17 | End: 2024-04-17

## 2024-04-17 RX ADMIN — SODIUM CHLORIDE, PRESERVATIVE FREE 10 ML: 5 INJECTION INTRAVENOUS at 20:58

## 2024-04-17 RX ADMIN — AZITHROMYCIN MONOHYDRATE 500 MG: 500 INJECTION, POWDER, LYOPHILIZED, FOR SOLUTION INTRAVENOUS at 09:12

## 2024-04-17 RX ADMIN — ENOXAPARIN SODIUM 40 MG: 100 INJECTION SUBCUTANEOUS at 08:54

## 2024-04-17 RX ADMIN — SODIUM CHLORIDE, PRESERVATIVE FREE 10 ML: 5 INJECTION INTRAVENOUS at 08:56

## 2024-04-17 RX ADMIN — BUSPIRONE HYDROCHLORIDE 5 MG: 5 TABLET ORAL at 08:54

## 2024-04-17 RX ADMIN — MEMANTINE 10 MG: 10 TABLET ORAL at 08:55

## 2024-04-17 RX ADMIN — BUSPIRONE HYDROCHLORIDE 5 MG: 5 TABLET ORAL at 20:57

## 2024-04-17 RX ADMIN — FAMOTIDINE 20 MG: 20 TABLET, FILM COATED ORAL at 20:57

## 2024-04-17 RX ADMIN — FAMOTIDINE 20 MG: 20 TABLET, FILM COATED ORAL at 08:54

## 2024-04-17 RX ADMIN — WATER 1000 MG: 1 INJECTION INTRAMUSCULAR; INTRAVENOUS; SUBCUTANEOUS at 08:55

## 2024-04-17 RX ADMIN — ACETAMINOPHEN 650 MG: 325 TABLET ORAL at 15:38

## 2024-04-17 RX ADMIN — CLONAZEPAM 1 MG: 1 TABLET ORAL at 20:57

## 2024-04-17 RX ADMIN — FUROSEMIDE 20 MG: 10 INJECTION, SOLUTION INTRAMUSCULAR; INTRAVENOUS at 08:55

## 2024-04-17 RX ADMIN — MEMANTINE 10 MG: 10 TABLET ORAL at 20:57

## 2024-04-17 RX ADMIN — ACETAMINOPHEN 650 MG: 325 TABLET ORAL at 10:52

## 2024-04-17 ASSESSMENT — PAIN DESCRIPTION - LOCATION: LOCATION: NECK

## 2024-04-17 ASSESSMENT — PAIN SCALES - PAIN ASSESSMENT IN ADVANCED DEMENTIA (PAINAD)
BREATHING: NORMAL
CONSOLABILITY: NO NEED TO CONSOLE
BODYLANGUAGE: RELAXED
TOTALSCORE: 0
FACIALEXPRESSION: SMILING OR INEXPRESSIVE

## 2024-04-17 ASSESSMENT — PAIN DESCRIPTION - DESCRIPTORS
DESCRIPTORS: ACHING
DESCRIPTORS: ACHING

## 2024-04-17 ASSESSMENT — PAIN SCALES - GENERAL: PAINLEVEL_OUTOF10: 0

## 2024-04-17 ASSESSMENT — PAIN SCALES - WONG BAKER: WONGBAKER_NUMERICALRESPONSE: NO HURT

## 2024-04-17 NOTE — PLAN OF CARE
Problem: Physical Therapy - Adult  Goal: By Discharge: Performs mobility at highest level of function for planned discharge setting.  See evaluation for individualized goals.  Description: FUNCTIONAL STATUS PRIOR TO ADMISSION: Patient with unknown prior level of function; lives at [facility] per RN report.     HOME SUPPORT PRIOR TO ADMISSION: Patient lived at [facility] facility     Physical Therapy Goals  Initiated 4/16/2024  1.  Patient will move from supine to sit and sit to supine, scoot up and down, and roll side to side in bed with modified independence within 7 day(s).    2.  Patient will perform sit to stand with contact guard assist within 7 day(s).  3.  Patient will transfer from bed to chair and chair to bed with contact guard assist using the least restrictive device within 7 day(s).  4.  Patient will ambulate with contact guard assist for 150 feet with the least restrictive device within 7 day(s).     Outcome: Progressing   PHYSICAL THERAPY TREATMENT    Patient: Shaunna Art (89 y.o. female)  Date: 4/17/2024  Diagnosis: Hypoxemia [R09.02]  Community acquired bacterial pneumonia [J15.9]  Fall, initial encounter [W19.XXXA]  Severe sepsis (HCC) [A41.9, R65.20]  Urinary tract infection without hematuria, site unspecified [N39.0]  Multifocal pneumonia [J18.9] Multifocal pneumonia      Precautions: Fall Risk (Risk for violence)                      ASSESSMENT:  Patient continues to benefit from skilled PT services and is progressing towards goals. Patient's daughter in law present during this session and reports that normally patient requires assistx1 for transfers to/from her wheelchair from memory care staff. She reports that patient is minimally ambulatory at her [facility] normally. Despite this, patient again tolerates ambulation to/from the restroom with up to modAx1 and RW. Small BM present. Patient requires constant support and totalA for perirenal hygiene in standing. Patient fatigued again

## 2024-04-17 NOTE — PROGRESS NOTES
DEANNA AYALA [facility]  [address] [place] [address]  [phone]      Hospitalist  Progress Note      NAME:       Shaunna Art   :        1933  MRM:        [id number]    Date of service: 2024      Subjective: Patient seen and examined by me. Patient admitted with pneumonia and UTI. She is still coughing and needing supplemental oxygen. She says she feels a little better. No vomiting. Needing a sitter.      Objective:    Vital Signs:    /71   Pulse (!) 105   Temp 98.1 °F (36.7 °C) (Oral)   Resp 24   Ht 1.575 m (5' 2\")   Wt 72.1 kg (159 lb)   SpO2 97%   BMI 29.08 kg/m²      No intake or output data in the 24 hours ending 24 0730     Current inpatient medications reviewed:  Current Facility-Administered Medications   Medication Dose Route Frequency    azithromycin (ZITHROMAX) 500 mg in sodium chloride 0.9 % 250 mL IVPB ([id number])  500 mg IntraVENous Q24H    sodium chloride flush 0.9 % injection 5-40 mL  5-40 mL IntraVENous 2 times per day    sodium chloride flush 0.9 % injection 5-40 mL  5-40 mL IntraVENous PRN    0.9 % sodium chloride infusion   IntraVENous PRN    potassium chloride (KLOR-CON) extended release tablet 40 mEq  40 mEq Oral PRN    Or    potassium bicarb-citric acid (EFFER-K) effervescent tablet 40 mEq  40 mEq Oral PRN    Or    potassium chloride 10 mEq/100 mL IVPB (Peripheral Line)  10 mEq IntraVENous PRN    magnesium sulfate 2000 mg in 50 mL IVPB premix  2,000 mg IntraVENous PRN    enoxaparin (LOVENOX) injection 40 mg  40 mg SubCUTAneous Daily    ondansetron (ZOFRAN-ODT) disintegrating tablet 4 mg  4 mg Oral Q8H PRN    Or    ondansetron (ZOFRAN) injection 4 mg  4 mg IntraVENous Q6H PRN    polyethylene glycol (GLYCOLAX) packet 17 g  17 g Oral Daily PRN    acetaminophen (TYLENOL) tablet 650 mg  650 mg Oral Q6H PRN    Or    acetaminophen (TYLENOL) suppository 650 mg  650 mg  patient at bedside during this period. To assist coordination of care and communication with nursing and staff, this note may be preliminary early in the day, but finalized by end of the day.         ___________________________________________________    Attending Physician:   Girish Yost MD

## 2024-04-17 NOTE — CARE COORDINATION
Initial Case Management Assessment           04/17/24 1916   Service Assessment   Cognition Dementia / Early Alzheimer's   Primary Caregiver Other (Comment)  (facility staff)   Support Systems Children;Other (Comment)  (facility staff)   Patient's Healthcare Decision Maker is: Named in Scanned ACP Document   PCP Verified by CM Yes  (MD at facility)   Last Visit to PCP Within last 3 months   Prior Functional Level Assistance with the following:;Bathing;Dressing;Toileting;Feeding;Cooking;Housework;Shopping;Mobility   Can patient return to prior living arrangement Yes   Financial Resources Other (Comment)  ([employer] medicare)   Community Resources None   Social/Functional History   Lives With Other (comment)  (facility staff)   Type of Home Assisted living   Home Layout One level   Home Equipment Wheelchair-manual   Receives Help From Other (comment)  (facility staff)   ADL Assistance Needs assistance   Toileting Needs assistance   Homemaking Assistance Needs assistance   Ambulation Assistance Needs assistance   Transfer Assistance Needs assistance   Occupation Retired   Discharge Planning   Type of Residence Assisted living   Living Arrangements Other (Comment)  ([facility])   DME Ordered? No   Potential Assistance Purchasing Medications No   Type of Home Care Services None   Patient expects to be discharged to: Assisted living   One/Two Story Residence One story   Services At/After Discharge   [place] Resource Information Provided? No   Mode of Transport at Discharge Other (see comment)  ([place] to transport)         Patient was admitted on 4/15/24 for multifocal pna. This CM called patient's daughter Calli [phone] introduced self, explained role, and confirmed demographic information on face sheet. Patient lives at  [facility] in the memory care unit. Facility staff assist patient with all ADL's. Calli states her mother has been in a wc for the last 6 months due

## 2024-04-17 NOTE — PLAN OF CARE
Problem: Occupational Therapy - Adult  Goal: By Discharge: Performs self-care activities at highest level of function for planned discharge setting.  See evaluation for individualized goals.  Description: FUNCTIONAL STATUS PRIOR TO ADMISSION:     ,  ,  ,  ,  ,  ,  ,  ,  ,  ,       HOME SUPPORT: Patient lived at [facility] (unclear of [facility] vs memory care) and was ambulatory with RW.     Occupational Therapy Goals:  Initiated 4/17/2024  1.  Patient will perform 2 grooming tasks with Minimal Assist within 7 day(s).  2.  Patient will perform upper body dressing with Moderate Assist within 7 day(s).  3.  Patient will perform toilet transfers with Contact Guard Assist  within 7 day(s).  4.  Patient will perform all aspects of toileting with Moderate Assist within 7 day(s).  5.  Patient will participate in upper extremity therapeutic exercise/activities with Moderate Assist for 5 minutes within 7 day(s).      Outcome: Progressing   OCCUPATIONAL THERAPY EVALUATION    Patient: Shaunna Art (89 y.o. female)  Date: 4/17/2024  Primary Diagnosis: Hypoxemia [R09.02]  Community acquired bacterial pneumonia [J15.9]  Fall, initial encounter [W19.XXXA]  Severe sepsis (HCC) [A41.9, R65.20]  Urinary tract infection without hematuria, site unspecified [N39.0]  Multifocal pneumonia [J18.9]         Precautions: Fall Risk (Risk for violence)                  ASSESSMENT :  The patient is limited by decreased functional mobility, independence in ADLs, activity tolerance, endurance, safety awareness, cognition, balance.  Based on the impairments listed above patient with fair tolerance for session. Cognition most limiting with patient unable to demonstrate carryover of education given throughout session. Mod A for log roll to EOB with patient impulsive once EOB attempting to get up immediately, easier to redirect today vs yesterday. Very poor RW management with patient lifting off ground and needing significant assist with turns. Min

## 2024-04-17 NOTE — PLAN OF CARE
Problem: Safety - Adult  Goal: Free from fall injury  4/17/2024 1130 by Nan Osuna, RN  Outcome: Progressing  Flowsheets (Taken 4/17/2024 1130)  Free From Fall Injury: Instruct family/caregiver on patient safety  4/17/2024 0839 by Radha Romo, RN  Outcome: Progressing

## 2024-04-18 LAB
ALBUMIN SERPL-MCNC: 3 G/DL (ref 3.5–5)
ALBUMIN/GLOB SERPL: 0.8 (ref 1.1–2.2)
ALP SERPL-CCNC: 76 U/L (ref 45–117)
ALT SERPL-CCNC: 14 U/L (ref 12–78)
ANION GAP SERPL CALC-SCNC: 4 MMOL/L (ref 5–15)
AST SERPL-CCNC: 16 U/L (ref 15–37)
BASOPHILS # BLD: 0 K/UL (ref 0–0.1)
BASOPHILS NFR BLD: 0 % (ref 0–1)
BILIRUB SERPL-MCNC: 0.8 MG/DL (ref 0.2–1)
BUN SERPL-MCNC: 9 MG/DL (ref 6–20)
BUN/CREAT SERPL: 12 (ref 12–20)
CALCIUM SERPL-MCNC: 9.1 MG/DL (ref 8.5–10.1)
CHLORIDE SERPL-SCNC: 104 MMOL/L (ref 97–108)
CO2 SERPL-SCNC: 26 MMOL/L (ref 21–32)
CREAT SERPL-MCNC: 0.78 MG/DL (ref 0.55–1.02)
DIFFERENTIAL METHOD BLD: ABNORMAL
EOSINOPHIL # BLD: 0.3 K/UL (ref 0–0.4)
EOSINOPHIL NFR BLD: 4 % (ref 0–7)
ERYTHROCYTE [DISTWIDTH] IN BLOOD BY AUTOMATED COUNT: 13.5 % (ref 11.5–14.5)
GLOBULIN SER CALC-MCNC: 3.6 G/DL (ref 2–4)
GLUCOSE SERPL-MCNC: 106 MG/DL (ref 65–100)
HCT VFR BLD AUTO: 33.7 % (ref 35–47)
HGB BLD-MCNC: 11.4 G/DL (ref 11.5–16)
IMM GRANULOCYTES # BLD AUTO: 0 K/UL (ref 0–0.04)
IMM GRANULOCYTES NFR BLD AUTO: 0 % (ref 0–0.5)
LYMPHOCYTES # BLD: 1.3 K/UL (ref 0.8–3.5)
LYMPHOCYTES NFR BLD: 20 % (ref 12–49)
MCH RBC QN AUTO: 29.8 PG (ref 26–34)
MCHC RBC AUTO-ENTMCNC: 33.8 G/DL (ref 30–36.5)
MCV RBC AUTO: 88.2 FL (ref 80–99)
MONOCYTES # BLD: 0.9 K/UL (ref 0–1)
MONOCYTES NFR BLD: 14 % (ref 5–13)
NEUTS SEG # BLD: 4.3 K/UL (ref 1.8–8)
NEUTS SEG NFR BLD: 62 % (ref 32–75)
NRBC # BLD: 0 K/UL (ref 0–0.01)
NRBC BLD-RTO: 0 PER 100 WBC
PLATELET # BLD AUTO: 248 K/UL (ref 150–400)
PMV BLD AUTO: 9.9 FL (ref 8.9–12.9)
POTASSIUM SERPL-SCNC: 3.2 MMOL/L (ref 3.5–5.1)
PROT SERPL-MCNC: 6.6 G/DL (ref 6.4–8.2)
RBC # BLD AUTO: 3.82 M/UL (ref 3.8–5.2)
SODIUM SERPL-SCNC: 134 MMOL/L (ref 136–145)
WBC # BLD AUTO: 6.9 K/UL (ref 3.6–11)

## 2024-04-18 PROCEDURE — 36415 COLL VENOUS BLD VENIPUNCTURE: CPT

## 2024-04-18 PROCEDURE — 6360000002 HC RX W HCPCS: Performed by: STUDENT IN AN ORGANIZED HEALTH CARE EDUCATION/TRAINING PROGRAM

## 2024-04-18 PROCEDURE — 97530 THERAPEUTIC ACTIVITIES: CPT

## 2024-04-18 PROCEDURE — 85025 COMPLETE CBC W/AUTO DIFF WBC: CPT

## 2024-04-18 PROCEDURE — 97116 GAIT TRAINING THERAPY: CPT

## 2024-04-18 PROCEDURE — 2700000000 HC OXYGEN THERAPY PER DAY

## 2024-04-18 PROCEDURE — 6360000002 HC RX W HCPCS: Performed by: INTERNAL MEDICINE

## 2024-04-18 PROCEDURE — 80053 COMPREHEN METABOLIC PANEL: CPT

## 2024-04-18 PROCEDURE — 2580000003 HC RX 258: Performed by: STUDENT IN AN ORGANIZED HEALTH CARE EDUCATION/TRAINING PROGRAM

## 2024-04-18 PROCEDURE — 6370000000 HC RX 637 (ALT 250 FOR IP): Performed by: STUDENT IN AN ORGANIZED HEALTH CARE EDUCATION/TRAINING PROGRAM

## 2024-04-18 PROCEDURE — 6370000000 HC RX 637 (ALT 250 FOR IP): Performed by: INTERNAL MEDICINE

## 2024-04-18 PROCEDURE — 1100000000 HC RM PRIVATE

## 2024-04-18 RX ORDER — FUROSEMIDE 10 MG/ML
20 INJECTION INTRAMUSCULAR; INTRAVENOUS ONCE
Status: COMPLETED | OUTPATIENT
Start: 2024-04-18 | End: 2024-04-18

## 2024-04-18 RX ORDER — FAMOTIDINE 20 MG/1
20 TABLET, FILM COATED ORAL DAILY
Status: DISCONTINUED | OUTPATIENT
Start: 2024-04-19 | End: 2024-04-19 | Stop reason: HOSPADM

## 2024-04-18 RX ORDER — AZITHROMYCIN 250 MG/1
500 TABLET, FILM COATED ORAL DAILY
Status: COMPLETED | OUTPATIENT
Start: 2024-04-19 | End: 2024-04-19

## 2024-04-18 RX ADMIN — ENOXAPARIN SODIUM 40 MG: 100 INJECTION SUBCUTANEOUS at 08:42

## 2024-04-18 RX ADMIN — FAMOTIDINE 20 MG: 20 TABLET, FILM COATED ORAL at 08:42

## 2024-04-18 RX ADMIN — AZITHROMYCIN MONOHYDRATE 500 MG: 500 INJECTION, POWDER, LYOPHILIZED, FOR SOLUTION INTRAVENOUS at 08:57

## 2024-04-18 RX ADMIN — FUROSEMIDE 20 MG: 10 INJECTION, SOLUTION INTRAMUSCULAR; INTRAVENOUS at 08:42

## 2024-04-18 RX ADMIN — WATER 1000 MG: 1 INJECTION INTRAMUSCULAR; INTRAVENOUS; SUBCUTANEOUS at 08:43

## 2024-04-18 RX ADMIN — BUSPIRONE HYDROCHLORIDE 5 MG: 5 TABLET ORAL at 21:02

## 2024-04-18 RX ADMIN — SODIUM CHLORIDE, PRESERVATIVE FREE 10 ML: 5 INJECTION INTRAVENOUS at 08:42

## 2024-04-18 RX ADMIN — MEMANTINE 10 MG: 10 TABLET ORAL at 21:02

## 2024-04-18 RX ADMIN — POTASSIUM BICARBONATE 40 MEQ: 782 TABLET, EFFERVESCENT ORAL at 21:25

## 2024-04-18 RX ADMIN — SODIUM CHLORIDE, PRESERVATIVE FREE 10 ML: 5 INJECTION INTRAVENOUS at 21:25

## 2024-04-18 RX ADMIN — BUSPIRONE HYDROCHLORIDE 5 MG: 5 TABLET ORAL at 08:42

## 2024-04-18 RX ADMIN — POTASSIUM BICARBONATE 40 MEQ: 782 TABLET, EFFERVESCENT ORAL at 14:52

## 2024-04-18 RX ADMIN — MEMANTINE 10 MG: 10 TABLET ORAL at 08:42

## 2024-04-18 RX ADMIN — POTASSIUM BICARBONATE 40 MEQ: 782 TABLET, EFFERVESCENT ORAL at 08:42

## 2024-04-18 RX ADMIN — POTASSIUM CHLORIDE 40 MEQ: 750 TABLET, FILM COATED, EXTENDED RELEASE ORAL at 06:08

## 2024-04-18 RX ADMIN — CLONAZEPAM 1 MG: 1 TABLET ORAL at 21:02

## 2024-04-18 ASSESSMENT — PAIN SCALES - PAIN ASSESSMENT IN ADVANCED DEMENTIA (PAINAD)
CONSOLABILITY: NO NEED TO CONSOLE
TOTALSCORE: 0
BODYLANGUAGE: RELAXED
BREATHING: NORMAL
FACIALEXPRESSION: SMILING OR INEXPRESSIVE

## 2024-04-18 ASSESSMENT — PAIN SCALES - GENERAL: PAINLEVEL_OUTOF10: 0

## 2024-04-18 NOTE — PROGRESS NOTES
Pharmacy Dosing Services: 04/18/24    The pharmacist has determined that this patient meets P & T approved criteria for conversion from IV to oral therapy for the following medication:Zithromax 500 mg IV q24hr x1 dose left      The pharmacist has written the following order for the patient: Zithromax 500 mg po q24hr x1 dose left   The pharmacist will continue to monitor the patient's status and advise the physician if conversion back to IV therapy is recommended.    Signed NAYANA SAAB, [facility]   [phone]

## 2024-04-18 NOTE — PLAN OF CARE
Problem: Physical Therapy - Adult  Goal: By Discharge: Performs mobility at highest level of function for planned discharge setting.  See evaluation for individualized goals.  Description: FUNCTIONAL STATUS PRIOR TO ADMISSION: Patient with unknown prior level of function; lives at [place] per RN report.     HOME SUPPORT PRIOR TO ADMISSION: Patient lived at [place] facility     Physical Therapy Goals  Initiated 4/16/2024  1.  Patient will move from supine to sit and sit to supine, scoot up and down, and roll side to side in bed with modified independence within 7 day(s).    2.  Patient will perform sit to stand with contact guard assist within 7 day(s).  3.  Patient will transfer from bed to chair and chair to bed with contact guard assist using the least restrictive device within 7 day(s).  4.  Patient will ambulate with contact guard assist for 150 feet with the least restrictive device within 7 day(s).     Outcome: Progressing   PHYSICAL THERAPY TREATMENT    Patient: Shaunna Art (89 y.o. female)  Date: 4/18/2024  Diagnosis: Hypoxemia [R09.02]  Community acquired bacterial pneumonia [J15.9]  Fall, initial encounter [W19.XXXA]  Severe sepsis (HCC) [A41.9, R65.20]  Urinary tract infection without hematuria, site unspecified [N39.0]  Multifocal pneumonia [J18.9] Multifocal pneumonia      Precautions: Fall Risk (Risk for violence)                      ASSESSMENT:  Patient continues to benefit from skilled PT services and is slowly progressing towards goals. Patient this morning with fair participation and tolerance to physical therapy session with complaints of feeling \"faint\" after BM in the restroom. Blood pressure at this time stable, 112/72. She remains alert throughout with cuing for ankle pumps, hand opening and closing and keeping her eyes open. Patient requires similar assistance level as yesterday, Edi to modA for bed mobility and transfers and ambulation to/from the restroom with assistx1 and

## 2024-04-18 NOTE — PROGRESS NOTES
Pharmacy Dosing Services: 04/18/24  Pepcid dose change per renal protocol  Physician Dr Yost    Serum Creatinine Lab Results   Component Value Date/Time    CREATININE 0.78 04/18/2024 05:15 AM      Creatinine Clearance Estimated Creatinine Clearance: 44 mL/min (based on SCr of 0.78 mg/dL).   BUN Lab Results   Component Value Date/Time    BUN 9 04/18/2024 05:15 AM         Current regimen: Pepcid 20 mg po BID  New regimen: Pepcid 20 mg po daily    Thank you  [phone]

## 2024-04-18 NOTE — PROGRESS NOTES
Occupational Therapy Note:  Chart reviewed.  Patient declined participation in OT evaluation this afternoon d/t fatigue despite max encouragement.  Will continue to follow.  Pastora Whittington, OTR/L

## 2024-04-18 NOTE — PLAN OF CARE
Problem: Discharge Planning  Goal: Discharge to home or other facility with appropriate resources  Outcome: Progressing     Problem: Skin/Tissue Integrity  Goal: Absence of new skin breakdown  Description: 1.  Monitor for areas of redness and/or skin breakdown  2.  Assess vascular access sites hourly  3.  Every 4-6 hours minimum:  Change oxygen saturation probe site  4.  Every 4-6 hours:  If on nasal continuous positive airway pressure, respiratory therapy assess nares and determine need for appliance change or resting period.  Outcome: Progressing     Problem: Safety - Adult  Goal: Free from fall injury  4/17/2024 2245 by Mary Lewis, RN  Outcome: Progressing  4/17/2024 1130 by Nan Osuna, RN  Outcome: Progressing  Flowsheets (Taken 4/17/2024 1130)  Free From Fall Injury: Instruct family/caregiver on patient safety     Problem: Physical Therapy - Adult  Goal: By Discharge: Performs mobility at highest level of function for planned discharge setting.  See evaluation for individualized goals.  Description: FUNCTIONAL STATUS PRIOR TO ADMISSION: Patient with unknown prior level of function; lives at [place] per RN report.     HOME SUPPORT PRIOR TO ADMISSION: Patient lived at [facility]     Physical Therapy Goals  Initiated 4/16/2024  1.  Patient will move from supine to sit and sit to supine, scoot up and down, and roll side to side in bed with modified independence within 7 day(s).    2.  Patient will perform sit to stand with contact guard assist within 7 day(s).  3.  Patient will transfer from bed to chair and chair to bed with contact guard assist using the least restrictive device within 7 day(s).  4.  Patient will ambulate with contact guard assist for 150 feet with the least restrictive device within 7 day(s).     4/17/2024 1353 by Joaquín Mullins, PT  Outcome: Progressing     Problem: Pain  Goal: Verbalizes/displays adequate comfort level or baseline comfort level  Outcome:

## 2024-04-18 NOTE — CARE COORDINATION
Case Management Progress Note    Discharge Plan:  Anticipate DC back to [facility] [phone]/Fax [phone].  DTR is assisting with transportation at [place].         04/18/24 1625   Discharge Planning   Patient expects to be discharged to: Assisted living   Services At/After Discharge   Transition of Care Consult (CM Consult) Other  (Memory Care Unit)   Services At/After Discharge None   [place] Resource Information Provided? No   Mode of Transport at Discharge Self   Confirm Follow Up Transport Family   Condition of Participation: Discharge Planning   The Plan for Transition of Care is related to the following treatment goals: DC back to [facility]   The Patient and/or Patient Representative was provided with a Choice of Provider? Patient;Patient Representative   Name of the Patient Representative who was provided with the Choice of Provider and agrees with the Discharge Plan?  Calli Art [phone]   The Patient and/Or Patient Representative agree with the Discharge Plan? Yes   Freedom of Choice list was provided with basic dialogue that supports the patient's individualized plan of care/goals, treatment preferences, and shares the quality data associated with the providers?  No     ______________________________________  JENNIFER Santacruz, RN-[facility]- Care Management  Available via [employer]  4/18/2024  4:23 PM

## 2024-04-18 NOTE — PROGRESS NOTES
DEANNA AYALA [facility]  [address] [place] [address]  [phone]      Hospitalist  Progress Note      NAME:       Shaunna Art   :        1933  MRM:        [id number]    Date of service: 2024      Subjective: Patient seen and examined by me. Patient admitted with pneumonia and UTI. She remains weak but says she feels much better. No cough or fever. No chest discomfort.       Objective:    Vital Signs:    /79   Pulse (!) 101   Temp 97.7 °F (36.5 °C) (Oral)   Resp 18   Ht 1.575 m (5' 2\")   Wt 72.1 kg (159 lb)   SpO2 97%   BMI 29.08 kg/m²        Intake/Output Summary (Last 24 hours) at 2024 0743  Last data filed at 2024 0742  Gross per 24 hour   Intake 300 ml   Output 500 ml   Net -200 ml        Current inpatient medications reviewed:  Current Facility-Administered Medications   Medication Dose Route Frequency    azithromycin (ZITHROMAX) 500 mg in sodium chloride 0.9 % 250 mL IVPB ([id number])  500 mg IntraVENous Q24H    sodium chloride flush 0.9 % injection 5-40 mL  5-40 mL IntraVENous 2 times per day    sodium chloride flush 0.9 % injection 5-40 mL  5-40 mL IntraVENous PRN    0.9 % sodium chloride infusion   IntraVENous PRN    potassium chloride (KLOR-CON) extended release tablet 40 mEq  40 mEq Oral PRN    Or    potassium bicarb-citric acid (EFFER-K) effervescent tablet 40 mEq  40 mEq Oral PRN    Or    potassium chloride 10 mEq/100 mL IVPB (Peripheral Line)  10 mEq IntraVENous PRN    magnesium sulfate 2000 mg in 50 mL IVPB premix  2,000 mg IntraVENous PRN    enoxaparin (LOVENOX) injection 40 mg  40 mg SubCUTAneous Daily    ondansetron (ZOFRAN-ODT) disintegrating tablet 4 mg  4 mg Oral Q8H PRN    Or    ondansetron (ZOFRAN) injection 4 mg  4 mg IntraVENous Q6H PRN    polyethylene glycol (GLYCOLAX) packet 17 g  17 g Oral Daily PRN    acetaminophen (TYLENOL) tablet 650 mg  650 mg Oral Q6H    Anticipated Disposition:  SNF/LTC vs HH PT, OT, RN    PCP:      None, None     I have personally examined and treated the patient at bedside during this period. To assist coordination of care and communication with nursing and staff, this note may be preliminary early in the day, but finalized by end of the day.         ___________________________________________________    Attending Physician:   Girish Yost MD

## 2024-04-19 VITALS
RESPIRATION RATE: 15 BRPM | BODY MASS INDEX: 29.26 KG/M2 | SYSTOLIC BLOOD PRESSURE: 114 MMHG | HEIGHT: 62 IN | HEART RATE: 105 BPM | WEIGHT: 159 LBS | TEMPERATURE: 97.7 F | DIASTOLIC BLOOD PRESSURE: 65 MMHG | OXYGEN SATURATION: 94 %

## 2024-04-19 PROCEDURE — 6370000000 HC RX 637 (ALT 250 FOR IP): Performed by: STUDENT IN AN ORGANIZED HEALTH CARE EDUCATION/TRAINING PROGRAM

## 2024-04-19 PROCEDURE — 94761 N-INVAS EAR/PLS OXIMETRY MLT: CPT

## 2024-04-19 PROCEDURE — 6370000000 HC RX 637 (ALT 250 FOR IP): Performed by: INTERNAL MEDICINE

## 2024-04-19 PROCEDURE — 6360000002 HC RX W HCPCS: Performed by: STUDENT IN AN ORGANIZED HEALTH CARE EDUCATION/TRAINING PROGRAM

## 2024-04-19 PROCEDURE — 2580000003 HC RX 258: Performed by: STUDENT IN AN ORGANIZED HEALTH CARE EDUCATION/TRAINING PROGRAM

## 2024-04-19 RX ORDER — FAMOTIDINE 20 MG/1
20 TABLET, FILM COATED ORAL DAILY
Qty: 30 TABLET | Refills: 0 | Status: SHIPPED
Start: 2024-04-19 | End: 2024-05-19

## 2024-04-19 RX ORDER — CEFDINIR 300 MG/1
300 CAPSULE ORAL 2 TIMES DAILY
Qty: 10 CAPSULE | Refills: 0 | Status: SHIPPED | OUTPATIENT
Start: 2024-04-19 | End: 2024-04-24

## 2024-04-19 RX ORDER — AZITHROMYCIN 500 MG/1
500 TABLET, FILM COATED ORAL DAILY
Qty: 2 TABLET | Refills: 0 | Status: SHIPPED | OUTPATIENT
Start: 2024-04-20 | End: 2024-04-22

## 2024-04-19 RX ORDER — CLONAZEPAM 1 MG/1
1 TABLET ORAL NIGHTLY
Qty: 60 TABLET | Refills: 0 | Status: SHIPPED | OUTPATIENT
Start: 2024-04-19 | End: 2024-04-21

## 2024-04-19 RX ORDER — BUSPIRONE HYDROCHLORIDE 5 MG/1
5 TABLET ORAL 2 TIMES DAILY
Qty: 60 TABLET | Refills: 0 | Status: SHIPPED | OUTPATIENT
Start: 2024-04-19 | End: 2024-05-19

## 2024-04-19 RX ADMIN — POTASSIUM BICARBONATE 40 MEQ: 782 TABLET, EFFERVESCENT ORAL at 11:22

## 2024-04-19 RX ADMIN — AZITHROMYCIN DIHYDRATE 500 MG: 250 TABLET ORAL at 09:17

## 2024-04-19 RX ADMIN — WATER 1000 MG: 1 INJECTION INTRAMUSCULAR; INTRAVENOUS; SUBCUTANEOUS at 09:16

## 2024-04-19 RX ADMIN — FAMOTIDINE 20 MG: 20 TABLET, FILM COATED ORAL at 09:17

## 2024-04-19 RX ADMIN — BUSPIRONE HYDROCHLORIDE 5 MG: 5 TABLET ORAL at 09:17

## 2024-04-19 RX ADMIN — ENOXAPARIN SODIUM 40 MG: 100 INJECTION SUBCUTANEOUS at 09:16

## 2024-04-19 RX ADMIN — POTASSIUM BICARBONATE 40 MEQ: 782 TABLET, EFFERVESCENT ORAL at 09:16

## 2024-04-19 RX ADMIN — SODIUM CHLORIDE, PRESERVATIVE FREE 10 ML: 5 INJECTION INTRAVENOUS at 11:19

## 2024-04-19 RX ADMIN — MEMANTINE 10 MG: 10 TABLET ORAL at 09:17

## 2024-04-19 NOTE — DISCHARGE SUMMARY
DEANNA AYALA [facility]  [address] [place] [address]  Tel: [phone]    Hospital Medicine Discharge Summary    Patient ID:    Shaunna Art  Age:              89 y.o.    : 1933  MRN:             [id number]     PCP: None, None     Date of Admission: 4/15/2024    Date of Discharge:  2024    Discharge Diagnoses:  Principal Problem:    Multifocal pneumonia    Compression fracture of thoracolumbar vertebra with routine healing    Hiatal hernia with GERD    Dementia (HCC)    Urinary tract infection    Falls frequently    Reason for admission:    Hypoxemia [R09.02]  Community acquired bacterial pneumonia [J15.9]  Fall, initial encounter [W19.XXXA]  Severe sepsis (HCC) [A41.9, R65.20]  Urinary tract infection without hematuria, site unspecified [N39.0]  Multifocal pneumonia [J18.9]    Diagnostic testing:    Laboratory data reviewed and independently interpreted:    Recent Labs     24  0924  0208 24  0515   WBC 8.1 8.2 6.9   HGB 11.3* 11.2* 11.4*   HCT 33.6* 32.9* 33.7*   RBC 3.76* 3.72* 3.82   MCV 89.4 88.4 88.2   MCH 30.1 30.1 29.8    238 248     No results found for: \"LACTA\"  Recent Labs     24  0926 24  0208 24  0515    135* 134*   K 3.4* 3.5 3.2*    105 104   CO2 26 24 26   GLUCOSE 89 98 106*   BUN 8 10 9   CREATININE 0.66 0.79 0.78   CALCIUM 8.9 8.5 9.1   PROT 6.7 6.6 6.6   BILITOT 0.6 0.7 0.8   ALKPHOS 87 82 76   AST 22 22 16   ALT 16 15 14     No components found for: \"GLUCOSEPOC\"  No results found for: \"CHOL\", \"TRIG\", \"HDL\", \"LDLCALC\"    Imaging data reviewed:    CTA CHEST W WO CONTRAST    Result Date: 4/15/2024  1. No evidence of pulmonary embolism. 2. Mild right upper and left lower lobe airspace disease consistent with infection. 3. Trace bilateral pleural effusions. 4. Age-indeterminate mid thoracic spine compression fracture as well as chronic appearing  examined.    General: well looking and in no acute distress  Pulm: clear breath sounds without wheezes  Card: no murmurs, normal S1, S2 without thrills, bruits   Abd:    soft, non-tender, normoactive bowel sounds  Skin: no rashes and skin turgor is good  Neuro: awake, alert and has a non focal     Activity: Activity as tolerated    Diet: regular diet    Current Discharge Medication List        START taking these medications    Details   clonazePAM (KLONOPIN) 1 MG tablet Take 1 tablet by mouth nightly for 2 days. Max Daily Amount: 1 mg  Qty: 60 tablet, Refills: 0    Associated Diagnoses: Anxiety      busPIRone (BUSPAR) 5 MG tablet Take 1 tablet by mouth 2 times daily  Qty: 60 tablet, Refills: 0      azithromycin (ZITHROMAX) 500 MG tablet Take 1 tablet by mouth daily for 2 doses  Qty: 2 tablet, Refills: 0      cefdinir (OMNICEF) 300 MG capsule Take 1 capsule by mouth 2 times daily for 5 days  Qty: 10 capsule, Refills: 0      famotidine (PEPCID) 20 MG tablet Take 1 tablet by mouth daily  Qty: 30 tablet, Refills: 0             No follow-up provider specified.    Follow-up tests or labs: As noted above    Discharge Condition: Stable    Disposition: long term care facility    Time taken to co-ordinate and arrange discharge:  more than 30 minutes.    Signed:  Girish Yost MD       4/19/2024   8:24 AM

## 2024-04-19 NOTE — CARE COORDINATION
Case Management Discharge Note     Discharge Plan:   DC back to [facility] [phone]/Fax [phone].  Transportation:  1300 via wheelchair van.  [facility] assisting with transport.      CM met with patient at the bedside.  Patient has Dementia and has a sitter at bedside.  CM called and spoke to patient's daughter, Calli Art [phone] and discussed discharge planning updates.  Patient's dtr is agreeable to the DCP back to [facility].  However, dtr stated that she is currently in [place] and is unable to assist with transportation.    CM called [facility] and spoke to to Pee and confirmed that they are able to assist with wheelchair transportation today at 1300.  CM informed patient's daughter of updates.      DC summary faxed to the facility.  Patient's dtr and Pee with [place] [facility] stated no other needs.         04/19/24 1416   Discharge Planning   Patient expects to be discharged to: Assisted living   Services At/After Discharge   Transition of Care Consult (CM Consult) Assisted Living   Services At/After Discharge Assisted living    Resource Information Provided? No   Mode of Transport at Discharge [facility] Transport Time of Discharge 1300   Confirm Follow Up Transport Wheelchair Van   Condition of Participation: Discharge Planning   The Plan for Transition of Care is related to the following treatment goals: DC back to [facility]   The Patient and/or Patient Representative was provided with a Choice of Provider? Patient;Patient Representative   Name of the Patient Representative who was provided with the Choice of Provider and agrees with the Discharge Plan?  Calli Art [phone]   The Patient and/Or Patient Representative agree with the Discharge Plan? Yes   Freedom of Choice list was provided with basic dialogue that supports the patient's individualized plan of care/goals, treatment

## 2024-04-19 NOTE — PLAN OF CARE
Problem: Discharge Planning  Goal: Discharge to home or other facility with appropriate resources  Outcome: Progressing     Problem: Skin/Tissue Integrity  Goal: Absence of new skin breakdown  Description: 1.  Monitor for areas of redness and/or skin breakdown  2.  Assess vascular access sites hourly  3.  Every 4-6 hours minimum:  Change oxygen saturation probe site  4.  Every 4-6 hours:  If on nasal continuous positive airway pressure, respiratory therapy assess nares and determine need for appliance change or resting period.  Outcome: Progressing     Problem: Safety - Adult  Goal: Free from fall injury  4/19/2024 0002 by Tatiana Goldman, RN  Outcome: Progressing  4/18/2024 1135 by Nan Osuna, RN  Outcome: Progressing  Flowsheets (Taken 4/18/2024 1135)  Free From Fall Injury: Instruct family/caregiver on patient safety     Problem: Pain  Goal: Verbalizes/displays adequate comfort level or baseline comfort level  Outcome: Progressing

## 2024-04-19 NOTE — PLAN OF CARE
Problem: Discharge Planning  Goal: Discharge to home or other facility with appropriate resources  4/19/2024 1023 by Abel Norton RN  Outcome: Adequate for Discharge  4/19/2024 0002 by Tatiana Goldman RN  Outcome: Progressing     Problem: Skin/Tissue Integrity  Goal: Absence of new skin breakdown  Description: 1.  Monitor for areas of redness and/or skin breakdown  2.  Assess vascular access sites hourly  3.  Every 4-6 hours minimum:  Change oxygen saturation probe site  4.  Every 4-6 hours:  If on nasal continuous positive airway pressure, respiratory therapy assess nares and determine need for appliance change or resting period.  4/19/2024 1023 by Abel Norton RN  Outcome: Adequate for Discharge  4/19/2024 0002 by Tatiana Goldman RN  Outcome: Progressing     Problem: Safety - Adult  Goal: Free from fall injury  4/19/2024 1023 by Abel Norton RN  Outcome: Adequate for Discharge  4/19/2024 0002 by Tatiana Goldman RN  Outcome: Progressing     Problem: Pain  Goal: Verbalizes/displays adequate comfort level or baseline comfort level  4/19/2024 1023 by Abel Norton RN  Outcome: Adequate for Discharge  4/19/2024 0002 by Tatiana Goldman RN  Outcome: Progressing

## 2024-04-19 NOTE — DISCHARGE INSTRUCTIONS
TRANSFER  INSTRUCTIONS    NAME: Shaunna Art   :  1933   MRN:  [id number]     Date:    2024     ADMIT DATE: 4/15/2024     TRANSFER DATE: 2024     DISPOSITION: long term care facility    DISCHARGE DIAGNOSIS:  Pneumonia    MEDICATIONS: See medication list on the AVS    Recommended diet:  regular diet    Recommended activity: Activity as tolerated    Follow Up:    No follow-up provider specified.    Information obtained by :  I understand that if any problems occur once I am at home I am to contact my physician.    I understand and acknowledge receipt of the instructions indicated above.                                                                                                                                           Physician's or R.N.'s Signature                                                                  Date/Time                                                                                                                                              Patient or Representative Signature                                                          Date/Time

## 2024-04-21 LAB
BACTERIA SPEC CULT: NORMAL
BACTERIA SPEC CULT: NORMAL
SERVICE CMNT-IMP: NORMAL
SERVICE CMNT-IMP: NORMAL

## 2025-03-03 NOTE — PLAN OF CARE
Problem: Safety - Adult  Goal: Free from fall injury  4/18/2024 1135 by Nan Osuna, RN  Outcome: Progressing  Flowsheets (Taken 4/18/2024 1135)  Free From Fall Injury: Instruct family/caregiver on patient safety  4/17/2024 2245 by Mary Lewis, RN  Outcome: Progressing      Received a request for clearance for Plavix    Placed in Dr Young mail box